# Patient Record
Sex: FEMALE | Race: WHITE | Employment: PART TIME | URBAN - METROPOLITAN AREA
[De-identification: names, ages, dates, MRNs, and addresses within clinical notes are randomized per-mention and may not be internally consistent; named-entity substitution may affect disease eponyms.]

---

## 2018-12-18 ENCOUNTER — HOSPITAL ENCOUNTER (EMERGENCY)
Facility: CLINIC | Age: 63
Discharge: ANOTHER HOSPITAL | End: 2018-12-19
Attending: EMERGENCY MEDICINE | Admitting: EMERGENCY MEDICINE
Payer: COMMERCIAL

## 2018-12-18 DIAGNOSIS — F41.9 ANXIETY: ICD-10-CM

## 2018-12-18 DIAGNOSIS — F10.220 ACUTE ALCOHOLIC INTOXICATION IN ALCOHOLISM WITHOUT COMPLICATION (H): ICD-10-CM

## 2018-12-18 LAB
ALBUMIN SERPL-MCNC: 3.3 G/DL (ref 3.4–5)
ALP SERPL-CCNC: 96 U/L (ref 40–150)
ALT SERPL W P-5'-P-CCNC: 27 U/L (ref 0–50)
ANION GAP SERPL CALCULATED.3IONS-SCNC: 5 MMOL/L (ref 3–14)
AST SERPL W P-5'-P-CCNC: 21 U/L (ref 0–45)
BASOPHILS # BLD AUTO: 0 10E9/L (ref 0–0.2)
BASOPHILS NFR BLD AUTO: 0.3 %
BILIRUB SERPL-MCNC: <0.1 MG/DL (ref 0.2–1.3)
BUN SERPL-MCNC: 21 MG/DL (ref 7–30)
CALCIUM SERPL-MCNC: 7.8 MG/DL (ref 8.5–10.1)
CHLORIDE SERPL-SCNC: 112 MMOL/L (ref 94–109)
CO2 SERPL-SCNC: 28 MMOL/L (ref 20–32)
CREAT SERPL-MCNC: 0.86 MG/DL (ref 0.52–1.04)
DIFFERENTIAL METHOD BLD: NORMAL
EOSINOPHIL # BLD AUTO: 0.2 10E9/L (ref 0–0.7)
EOSINOPHIL NFR BLD AUTO: 3.7 %
ERYTHROCYTE [DISTWIDTH] IN BLOOD BY AUTOMATED COUNT: 15 % (ref 10–15)
ETHANOL SERPL-MCNC: 0.17 G/DL
GFR SERPL CREATININE-BSD FRML MDRD: 67 ML/MIN/1.7M2
GLUCOSE SERPL-MCNC: 88 MG/DL (ref 70–99)
HCT VFR BLD AUTO: 37 % (ref 35–47)
HGB BLD-MCNC: 12.2 G/DL (ref 11.7–15.7)
IMM GRANULOCYTES # BLD: 0 10E9/L (ref 0–0.4)
IMM GRANULOCYTES NFR BLD: 0.2 %
INR PPP: 0.95 (ref 0.86–1.14)
LIPASE SERPL-CCNC: 206 U/L (ref 73–393)
LYMPHOCYTES # BLD AUTO: 2.4 10E9/L (ref 0.8–5.3)
LYMPHOCYTES NFR BLD AUTO: 42.6 %
MAGNESIUM SERPL-MCNC: 2.2 MG/DL (ref 1.6–2.3)
MCH RBC QN AUTO: 30.1 PG (ref 26.5–33)
MCHC RBC AUTO-ENTMCNC: 33 G/DL (ref 31.5–36.5)
MCV RBC AUTO: 91 FL (ref 78–100)
MONOCYTES # BLD AUTO: 0.5 10E9/L (ref 0–1.3)
MONOCYTES NFR BLD AUTO: 8.4 %
NEUTROPHILS # BLD AUTO: 2.6 10E9/L (ref 1.6–8.3)
NEUTROPHILS NFR BLD AUTO: 44.8 %
NRBC # BLD AUTO: 0 10*3/UL
NRBC BLD AUTO-RTO: 0 /100
PHOSPHATE SERPL-MCNC: 3.5 MG/DL (ref 2.5–4.5)
PLATELET # BLD AUTO: 261 10E9/L (ref 150–450)
POTASSIUM SERPL-SCNC: 4 MMOL/L (ref 3.4–5.3)
PROT SERPL-MCNC: 6.6 G/DL (ref 6.8–8.8)
RBC # BLD AUTO: 4.05 10E12/L (ref 3.8–5.2)
SODIUM SERPL-SCNC: 145 MMOL/L (ref 133–144)
WBC # BLD AUTO: 5.7 10E9/L (ref 4–11)

## 2018-12-18 PROCEDURE — 96374 THER/PROPH/DIAG INJ IV PUSH: CPT

## 2018-12-18 PROCEDURE — 83690 ASSAY OF LIPASE: CPT | Performed by: EMERGENCY MEDICINE

## 2018-12-18 PROCEDURE — 25000132 ZZH RX MED GY IP 250 OP 250 PS 637: Performed by: EMERGENCY MEDICINE

## 2018-12-18 PROCEDURE — 85025 COMPLETE CBC W/AUTO DIFF WBC: CPT | Performed by: EMERGENCY MEDICINE

## 2018-12-18 PROCEDURE — 99285 EMERGENCY DEPT VISIT HI MDM: CPT | Mod: 25

## 2018-12-18 PROCEDURE — 83735 ASSAY OF MAGNESIUM: CPT | Performed by: EMERGENCY MEDICINE

## 2018-12-18 PROCEDURE — 80320 DRUG SCREEN QUANTALCOHOLS: CPT | Performed by: EMERGENCY MEDICINE

## 2018-12-18 PROCEDURE — 96376 TX/PRO/DX INJ SAME DRUG ADON: CPT

## 2018-12-18 PROCEDURE — 85610 PROTHROMBIN TIME: CPT | Performed by: EMERGENCY MEDICINE

## 2018-12-18 PROCEDURE — 25000128 H RX IP 250 OP 636: Performed by: EMERGENCY MEDICINE

## 2018-12-18 PROCEDURE — 84100 ASSAY OF PHOSPHORUS: CPT | Performed by: EMERGENCY MEDICINE

## 2018-12-18 PROCEDURE — 80053 COMPREHEN METABOLIC PANEL: CPT | Performed by: EMERGENCY MEDICINE

## 2018-12-18 PROCEDURE — 96361 HYDRATE IV INFUSION ADD-ON: CPT

## 2018-12-18 RX ORDER — PAROXETINE 40 MG/1
40 TABLET, FILM COATED ORAL EVERY MORNING
Status: ON HOLD | COMMUNITY
End: 2018-12-21

## 2018-12-18 RX ORDER — OLANZAPINE 10 MG/1
10 TABLET, ORALLY DISINTEGRATING ORAL AT BEDTIME
Status: DISCONTINUED | OUTPATIENT
Start: 2018-12-18 | End: 2018-12-19 | Stop reason: HOSPADM

## 2018-12-18 RX ORDER — LEVOTHYROXINE SODIUM 100 UG/1
TABLET ORAL DAILY
COMMUNITY
End: 2018-12-19

## 2018-12-18 RX ORDER — MAGNESIUM OXIDE 400 MG/1
800 TABLET ORAL ONCE
Status: COMPLETED | OUTPATIENT
Start: 2018-12-18 | End: 2018-12-18

## 2018-12-18 RX ORDER — FOLIC ACID 1 MG/1
1 TABLET ORAL ONCE
Status: COMPLETED | OUTPATIENT
Start: 2018-12-18 | End: 2018-12-18

## 2018-12-18 RX ORDER — GABAPENTIN 100 MG/1
600 CAPSULE ORAL 3 TIMES DAILY
Status: ON HOLD | COMMUNITY
End: 2018-12-21

## 2018-12-18 RX ORDER — SODIUM CHLORIDE, SODIUM LACTATE, POTASSIUM CHLORIDE, CALCIUM CHLORIDE 600; 310; 30; 20 MG/100ML; MG/100ML; MG/100ML; MG/100ML
1000 INJECTION, SOLUTION INTRAVENOUS CONTINUOUS
Status: DISCONTINUED | OUTPATIENT
Start: 2018-12-18 | End: 2018-12-19 | Stop reason: HOSPADM

## 2018-12-18 RX ORDER — LANOLIN ALCOHOL/MO/W.PET/CERES
100 CREAM (GRAM) TOPICAL ONCE
Status: COMPLETED | OUTPATIENT
Start: 2018-12-18 | End: 2018-12-18

## 2018-12-18 RX ORDER — TRAZODONE HYDROCHLORIDE 50 MG/1
50 TABLET, FILM COATED ORAL AT BEDTIME
COMMUNITY
End: 2018-12-19

## 2018-12-18 RX ORDER — LORAZEPAM 2 MG/ML
1 INJECTION INTRAMUSCULAR
Status: DISCONTINUED | OUTPATIENT
Start: 2018-12-18 | End: 2018-12-19 | Stop reason: HOSPADM

## 2018-12-18 RX ORDER — MULTIVITAMIN,THERAPEUTIC
1 TABLET ORAL ONCE
Status: COMPLETED | OUTPATIENT
Start: 2018-12-18 | End: 2018-12-18

## 2018-12-18 RX ADMIN — THERA TABS 1 TABLET: TAB at 17:06

## 2018-12-18 RX ADMIN — Medication 100 MG: at 17:06

## 2018-12-18 RX ADMIN — MAGNESIUM OXIDE TAB 400 MG (241.3 MG ELEMENTAL MG) 800 MG: 400 (241.3 MG) TAB at 18:11

## 2018-12-18 RX ADMIN — LORAZEPAM 1 MG: 2 INJECTION INTRAMUSCULAR; INTRAVENOUS at 17:08

## 2018-12-18 RX ADMIN — FOLIC ACID 1 MG: 1 TABLET ORAL at 17:06

## 2018-12-18 RX ADMIN — OLANZAPINE 10 MG: 10 TABLET, ORALLY DISINTEGRATING ORAL at 18:12

## 2018-12-18 RX ADMIN — LORAZEPAM 1 MG: 2 INJECTION INTRAMUSCULAR; INTRAVENOUS at 19:02

## 2018-12-18 RX ADMIN — SODIUM CHLORIDE, POTASSIUM CHLORIDE, SODIUM LACTATE AND CALCIUM CHLORIDE 1000 ML: 600; 310; 30; 20 INJECTION, SOLUTION INTRAVENOUS at 17:06

## 2018-12-18 ASSESSMENT — ENCOUNTER SYMPTOMS: NERVOUS/ANXIOUS: 1

## 2018-12-18 ASSESSMENT — MIFFLIN-ST. JEOR: SCORE: 1347.86

## 2018-12-18 NOTE — ED PROVIDER NOTES
"  History     Chief Complaint:  Alcohol Intoxication and anxiety    The history is provided by the patient. History limited by: the patient is a poor historian.      Kay Dunham is a 63 year old female who presents for evaluation of alcohol intoxication and anxiety.  The patient reports a long history of alcohol abuse over the last several years; longest stretch of sobriety was 6 years.  Patient notes that in the last 3 months she has drunk about 1.5 pints of liquor per day and that she will frequently become anxious when she is not drinking.  She states that she \"only drinks alcohol to calm down.\"  The patient states that her last drink was 1 hour prior to presentation.  The patient's  states that she began to act very anxious and different than he had ever seen her act, prompting him to call his insurance who referred him to the emergency department.  The patient's  notes that he has never seen her act this way.  The patient does state that she sought evaluation 2 weeks ago and was told to seek out a psychiatrist.  Patient denies drug use, allergies to any drugs, thoughts of self-harm, suicidal ideation, or other complaint.  The patient denies any known psychiatric diagnosis.    Allergies:  Contrast Dye      Medications:    Neurontin  Levothyroxine  Paxil  Desyrel     Past Medical History:    The patient does not have any past pertinent medical history.     Past Surgical History:    Orthopedic surgery    Family History:    History reviewed. No pertinent family history.      Social History:  Presents with    Tobacco use: Never smoker   Alcohol use: Yes (1.5 pints of liquor per day at least)  PCP: No primary care provider on file.    Marital Status:      Review of Systems   Psychiatric/Behavioral: Negative for self-injury and suicidal ideas. The patient is nervous/anxious.    All other systems reviewed and are negative.    Physical Exam     Patient Vitals for the past 24 hrs:   BP Temp " "Temp src Pulse Heart Rate Resp SpO2 Height Weight   12/18/18 2200 97/58 -- -- 79 78 15 -- -- --   12/18/18 2100 102/58 -- -- 82 80 15 -- -- --   12/18/18 2000 119/60 -- -- 91 98 17 -- -- --   12/18/18 1920 110/62 -- -- 85 -- 16 91 % -- --   12/18/18 1616 122/64 98.3  F (36.8  C) Temporal 81 -- 18 96 % 1.676 m (5' 6\") 77.1 kg (170 lb)        Physical Exam  Nursing note and vitals reviewed.  Constitutional:  Oriented to person, place, and time. Cooperative. Smells of alcohol.  HENT:   Nose:    Nose normal.   Mouth/Throat:   Mucous membranes are normal.   Eyes:    Conjunctivae normal and EOM are normal.      Pupils are equal, round, and reactive to light.   Neck:    Trachea normal.   Cardiovascular:  Normal rate, regular rhythm, normal heart sounds and normal pulses. No murmur heard.  Pulmonary/Chest:  Effort normal and breath sounds normal.   Abdominal:   Soft. Normal appearance and bowel sounds are normal.      There is no tenderness.      There is no rebound and no CVA tenderness.   Musculoskeletal:  Extremities atraumatic x 4.   Lymphadenopathy:  No cervical adenopathy.   Neurological:   Alert and oriented to person, place, and time. Normal strength.      No cranial nerve deficit or sensory deficit. GCS eye subscore is 4. GCS verbal subscore is 5. GCS motor subscore is 6.   Skin:    Skin is intact. No rash noted.   Psychiatric:   Extremely anxious with psychomotor agitation present. Denies any suicidal thoughts.    Emergency Department Course     Laboratory:  CBC: AWNL (WBC 5.7, HGB 12.2, )   CMP: Na 145 (H), Cl 112 (H0, Ca 7.8 (L), Bilirubin <0.1 (L), Albumin 3.3 (L), Protein 6.6 (L) o/w WNL (Creatinine 0.86)   INR: 0.95  Magnesium: 2.2  Phosphorous: 3.5  Lipase: 206  EtOH: 0.17 (H)    Interventions:  1706: NS 1L IV Bolus  1706: Multivitamin 1 tablet PO  1706: Folvite 1 mg PO  1706: Thiamine 100 mg PO  1811: Magnesium oxide 800 mg PO   1812: Zyprexa 10 mg PO  1902: Ativan 1 mg IV     Emergency Department " Course:  Past medical records, nursing notes, and vitals reviewed.  1645: I performed an exam of the patient and obtained history, as documented above.    IV inserted and blood drawn. Above interventions provided. Blood was sent to the lab for further testing, results above.    1840: I rechecked the patient. Explained findings to the patient.    Patient signed out to the [facility] emergency department physician, Dr. Lea. Please see their note for further treatment, examination findings, and care the patient received while in the emergency department.      Impression & Plan    Medical Decision Making:  This is a 63-year-old female who came in for help with her alcoholic abuse as well as anxiety.  Based on her presentation, I do not believe that she is in withdrawal though.  However she was extremely anxious to the point that she required not only IV Ativan but oral Zyprexa.  She ultimately required a second IV dose of Ativan as well, however that seemed to sedate her to the point where she could not be assessed by our DEC .  She is not suicidal, and I also do not feel that she is appropriate for a detox facility at this time due to her significant anxiety.  I think she would potentially benefit from a psych evaluation in addition to a chem dep evaluation.  At this point she is still too sleepy for a DEC assessment though.  Therefore I will be signing her out to my partner Dr. Lea, who will be assuming her care.  Ultimately though, if she decides she wants to leave, I do not believe she is holdable.  However all of the appropriate paperwork has been filled out in case she does end up being admitted somewhere else.    Diagnosis:    ICD-10-CM    1. Acute alcoholic intoxication in alcoholism without complication (H) F10.220    2. Anxiety F41.9        Disposition:  Patient signed out to the [facility] emergency department physician, Dr. Lea.      Scribe Disclosure:  I, Shreyas Baires, am serving as a scribe at  8:31 PM on 12/18/2018 to document services personally performed by Kemar Mckeon MD based on my observations and the provider's statements to me.  12/18/2018   EMERGENCY DEPARTMENT      Kemar Mckeon MD  12/18/18 [id number]

## 2018-12-19 ENCOUNTER — HOSPITAL ENCOUNTER (INPATIENT)
Facility: CLINIC | Age: 63
LOS: 8 days | Discharge: HOME | End: 2018-12-27
Attending: PSYCHIATRY & NEUROLOGY | Admitting: PSYCHIATRY & NEUROLOGY
Payer: COMMERCIAL

## 2018-12-19 ENCOUNTER — TELEPHONE (OUTPATIENT)
Dept: BEHAVIORAL HEALTH | Facility: CLINIC | Age: 63
End: 2018-12-19

## 2018-12-19 VITALS
WEIGHT: 170 LBS | OXYGEN SATURATION: 97 % | HEIGHT: 66 IN | DIASTOLIC BLOOD PRESSURE: 72 MMHG | TEMPERATURE: 98.3 F | RESPIRATION RATE: 11 BRPM | SYSTOLIC BLOOD PRESSURE: 126 MMHG | BODY MASS INDEX: 27.32 KG/M2 | HEART RATE: 72 BPM

## 2018-12-19 DIAGNOSIS — F51.04 PSYCHOPHYSIOLOGICAL INSOMNIA: ICD-10-CM

## 2018-12-19 DIAGNOSIS — F10.20 UNCOMPLICATED ALCOHOL DEPENDENCE (H): Primary | ICD-10-CM

## 2018-12-19 DIAGNOSIS — F41.8 DEPRESSION WITH ANXIETY: ICD-10-CM

## 2018-12-19 PROCEDURE — 90791 PSYCH DIAGNOSTIC EVALUATION: CPT

## 2018-12-19 PROCEDURE — 25000132 ZZH RX MED GY IP 250 OP 250 PS 637: Performed by: EMERGENCY MEDICINE

## 2018-12-19 PROCEDURE — 12800012 ZZH R&B CD MH INTERMEDIATE ADULT

## 2018-12-19 PROCEDURE — 25000132 ZZH RX MED GY IP 250 OP 250 PS 637: Performed by: PSYCHIATRY & NEUROLOGY

## 2018-12-19 PROCEDURE — HZ2ZZZZ DETOXIFICATION SERVICES FOR SUBSTANCE ABUSE TREATMENT: ICD-10-PCS | Performed by: PSYCHIATRY & NEUROLOGY

## 2018-12-19 RX ORDER — ACETAMINOPHEN 325 MG/1
650 TABLET ORAL EVERY 4 HOURS PRN
Status: DISCONTINUED | OUTPATIENT
Start: 2018-12-19 | End: 2018-12-27 | Stop reason: HOSPADM

## 2018-12-19 RX ORDER — PAROXETINE 40 MG/1
40 TABLET, FILM COATED ORAL EVERY MORNING
Status: DISCONTINUED | OUTPATIENT
Start: 2018-12-20 | End: 2018-12-27 | Stop reason: HOSPADM

## 2018-12-19 RX ORDER — PAROXETINE 40 MG/1
40 TABLET, FILM COATED ORAL ONCE
Status: COMPLETED | OUTPATIENT
Start: 2018-12-19 | End: 2018-12-19

## 2018-12-19 RX ORDER — FOLIC ACID 1 MG/1
1 TABLET ORAL DAILY
Status: DISCONTINUED | OUTPATIENT
Start: 2018-12-19 | End: 2018-12-27 | Stop reason: HOSPADM

## 2018-12-19 RX ORDER — PROPRANOLOL HYDROCHLORIDE 10 MG/1
10 TABLET ORAL 3 TIMES DAILY PRN
Status: DISCONTINUED | OUTPATIENT
Start: 2018-12-19 | End: 2018-12-27 | Stop reason: HOSPADM

## 2018-12-19 RX ORDER — LANOLIN ALCOHOL/MO/W.PET/CERES
100 CREAM (GRAM) TOPICAL DAILY
Status: DISCONTINUED | OUTPATIENT
Start: 2018-12-19 | End: 2018-12-20

## 2018-12-19 RX ORDER — ACETAMINOPHEN 500 MG
500 TABLET ORAL ONCE
Status: COMPLETED | OUTPATIENT
Start: 2018-12-19 | End: 2018-12-19

## 2018-12-19 RX ORDER — GABAPENTIN 600 MG/1
600 TABLET ORAL ONCE
Status: COMPLETED | OUTPATIENT
Start: 2018-12-19 | End: 2018-12-19

## 2018-12-19 RX ORDER — LEVOTHYROXINE SODIUM 75 UG/1
75 TABLET ORAL DAILY
Status: ON HOLD | COMMUNITY
End: 2019-10-01

## 2018-12-19 RX ORDER — DIAZEPAM 5 MG
5-20 TABLET ORAL EVERY 30 MIN PRN
Status: DISCONTINUED | OUTPATIENT
Start: 2018-12-19 | End: 2018-12-27 | Stop reason: HOSPADM

## 2018-12-19 RX ORDER — GABAPENTIN 300 MG/1
600 CAPSULE ORAL 3 TIMES DAILY
Status: DISCONTINUED | OUTPATIENT
Start: 2018-12-19 | End: 2018-12-27 | Stop reason: HOSPADM

## 2018-12-19 RX ORDER — LORAZEPAM 1 MG/1
1 TABLET ORAL ONCE
Status: COMPLETED | OUTPATIENT
Start: 2018-12-19 | End: 2018-12-19

## 2018-12-19 RX ORDER — ALUMINA, MAGNESIA, AND SIMETHICONE 2400; 2400; 240 MG/30ML; MG/30ML; MG/30ML
30 SUSPENSION ORAL EVERY 4 HOURS PRN
Status: DISCONTINUED | OUTPATIENT
Start: 2018-12-19 | End: 2018-12-27 | Stop reason: HOSPADM

## 2018-12-19 RX ORDER — BISACODYL 10 MG
10 SUPPOSITORY, RECTAL RECTAL DAILY PRN
Status: DISCONTINUED | OUTPATIENT
Start: 2018-12-19 | End: 2018-12-27 | Stop reason: HOSPADM

## 2018-12-19 RX ORDER — HYDROXYZINE HYDROCHLORIDE 25 MG/1
25-50 TABLET, FILM COATED ORAL 3 TIMES DAILY PRN
Status: ON HOLD | COMMUNITY
End: 2018-12-21

## 2018-12-19 RX ORDER — LEVOTHYROXINE SODIUM 75 UG/1
75 TABLET ORAL ONCE
Status: COMPLETED | OUTPATIENT
Start: 2018-12-19 | End: 2018-12-19

## 2018-12-19 RX ORDER — ZOLMITRIPTAN 5 MG/1
5 TABLET, ORALLY DISINTEGRATING ORAL DAILY PRN
Status: ON HOLD | COMMUNITY
End: 2019-10-01

## 2018-12-19 RX ORDER — MULTIPLE VITAMINS W/ MINERALS TAB 9MG-400MCG
1 TAB ORAL DAILY
Status: DISCONTINUED | OUTPATIENT
Start: 2018-12-19 | End: 2018-12-27 | Stop reason: HOSPADM

## 2018-12-19 RX ORDER — LEVOTHYROXINE SODIUM 75 UG/1
75 TABLET ORAL DAILY
Status: DISCONTINUED | OUTPATIENT
Start: 2018-12-20 | End: 2018-12-27 | Stop reason: HOSPADM

## 2018-12-19 RX ADMIN — GABAPENTIN 600 MG: 300 CAPSULE ORAL at 15:33

## 2018-12-19 RX ADMIN — DIAZEPAM 10 MG: 5 TABLET ORAL at 20:23

## 2018-12-19 RX ADMIN — PAROXETINE 40 MG: 40 TABLET, FILM COATED ORAL at 11:05

## 2018-12-19 RX ADMIN — LORAZEPAM 1 MG: 1 TABLET ORAL at 11:04

## 2018-12-19 RX ADMIN — MULTIPLE VITAMINS W/ MINERALS TAB 1 TABLET: TAB at 15:33

## 2018-12-19 RX ADMIN — ACETAMINOPHEN 500 MG: 500 TABLET, FILM COATED ORAL at 10:11

## 2018-12-19 RX ADMIN — FOLIC ACID 1 MG: 1 TABLET ORAL at 15:34

## 2018-12-19 RX ADMIN — Medication 100 MG: at 15:34

## 2018-12-19 RX ADMIN — GABAPENTIN 600 MG: 300 CAPSULE ORAL at 20:23

## 2018-12-19 RX ADMIN — GABAPENTIN 600 MG: 600 TABLET, FILM COATED ORAL at 11:05

## 2018-12-19 RX ADMIN — LEVOTHYROXINE SODIUM 75 MCG: 75 TABLET ORAL at 11:05

## 2018-12-19 RX ADMIN — IBUPROFEN 800 MG: 200 TABLET, FILM COATED ORAL at 10:11

## 2018-12-19 ASSESSMENT — MIFFLIN-ST. JEOR: SCORE: 1377.35

## 2018-12-19 ASSESSMENT — ACTIVITIES OF DAILY LIVING (ADL)
LAUNDRY: WITH SUPERVISION
ORAL_HYGIENE: INDEPENDENT
HYGIENE/GROOMING: INDEPENDENT
DRESS: INDEPENDENT

## 2018-12-19 NOTE — PHARMACY-ADMISSION MEDICATION HISTORY
Admission medication history interview status for the 12/18/2018  admission is complete. See EPIC admission navigator for prior to admission medications     Medication history source reliability:Moderate    Actions taken by pharmacist (provider contacted, etc):  Spoke w/ patient.  Reviewed information in Care Everywhere.      Additional medication history information not noted on PTA med list :None    Medication reconciliation/reorder completed by provider prior to medication history? No    Time spent in this activity: 20 minutes    Prior to Admission medications    Medication Sig Last Dose Taking? Auth Provider   gabapentin (NEURONTIN) 100 MG capsule Take 600 mg by mouth 3 times daily 12/18/2018 at Unknown time Yes Reported, Patient   hydrOXYzine (ATARAX) 25 MG tablet Take 25-50 mg by mouth 3 times daily as needed for itching  at prn Yes Unknown, Entered By History   levothyroxine (SYNTHROID/LEVOTHROID) 75 MCG tablet Take 75 mcg by mouth daily 12/18/2018 at Unknown time Yes Unknown, Entered By History   PARoxetine (PAXIL) 40 MG tablet Take 40 mg by mouth every morning 12/18/2018 at Unknown time Yes Reported, Patient   ZOLMitriptan (ZOMIG-ZMT) 5 MG ODT Take 5 mg by mouth at onset of headache for migraine  at prn Yes Unknown, Entered By History     Kassandra Wilburn, PharmD, BCPS

## 2018-12-19 NOTE — ED NOTES
Pt brought back to room and was unsteady on feet. Pt asked to sit down on the bed and pt then laid on the floor instead. Pt did not hit her head and there was no LOC, it appeared to be more behavior than anything else.

## 2018-12-19 NOTE — TELEPHONE ENCOUNTER
S: Lacie gave clinical saying pt, age 63, was bib her  to [facility] er last juan due to intoxication.   B: bal was .17. She says she has been drinking 1.5 pints of liquor per day for the past 3 mo's. Hx of cd tx 15 yrs ago. She was sober for 6 yrs and says she has been drinking for the past year and half and heavily for past 3 mo's. No hx of sz's. Pt denies drug use. Utox not ordered. Thyroid issues but no other chronic med prob's. Hx of ADHD. She has anxiety and says she randomly starts counting and says she drinks to alleviate her anxiety.   A: assessed today; walking, coop, vol, med cleared, denies SI  R: john astorga at 9:21 am;   #3awest/straight cd/ kateali for after NOON. kristal

## 2018-12-19 NOTE — PLAN OF CARE
63 year old female being admitted for alcohol withdrawal. Pt came to unit via ambulance from [facility] ER. While at [facility] ER pt was given prn Zyprexa for being agitated. Pt BAL was 0.17. Pt has been drinking 1 1/2 pint of vodka daily. States she has been drinking this much for 1 1/2 years. Says she was sober for 6 years previously     Pt has been living in [place] for the past 4 years or more caring for her mother who had throat cancer. Pt's  stayed here in [place]. Pt's mother passed away 11 1/2 months ago. Pt came back to [place] 3 months ago. Pt has been to outpatient treatment in [place] 20 years ago.     Pt has had 5 back surgeries, right abdominal hernia and a hysterectomy.   Pt states she has chronic back pain and 10 months ago was hospitalized in [place] for 10 days for some type of possible GI Bleed. States she was taking a lot of motrin for pain. States she needed to have blood and iron transfusions. Pt reports  she also received some type of CD treatment at that time.     Kay states she tested +for MRSA related to a wound in her vaginal labia area. Pt not longer has a wound there. Message left for Infection control regarding this.     Past history methamphetamine abuse for 1 1/2 years.  Pt denies hx of DT's Seizures. Dx with depression and is on Paxil. Pt says her doctor stopped her trazodone because of a negative interaction with the Paxil.   Denies MH admissions. Denies SI on admission.    Pt is  and has 5 adult children. She is a retired .     During admission process patient was very restless. Akathesis like movements. Dr. Guerrero ordered prn Propanolol and requested a dose be given.     Patient states she is interested in Detox only at this time.     Plan: Status 15, Monitor mood and alcohol withdrawal. Fall precautions, fall bracelet and bell provided.

## 2018-12-19 NOTE — ED PROVIDER NOTES
Pt evaluated by DEC. No longer paranoid. States she's been drinking again for past 1.5 years to help control her anxiety and ADHD (and has not been compliant with her Concerta). She denies HI/SI. Is interested in detox bed. As of now there appears to be one available.  MD Anita Howard, Lalitha Galan MD  12/19/18 1595

## 2018-12-19 NOTE — ED NOTES
"Pt incredibly anxious, pacing in room and out of room in common area. Speech is pressured and filled with anxiety. Pt coming to  window every 5 minutes or so asking when the medicine is going to work. Pt states \"I'm a tough bird, I want more drugs now!\" RN redirected pt and provided a calmer envt by asking  to sit out in the ma.  "

## 2018-12-19 NOTE — ED NOTES
Signed out by Dr. Mckeon at change of shift.  In brief, patient has alcoholism and arrived wanting detox.  Was so anxious that she seemed psychotic, difficult to redirect, pressured speech.  Drinks to manage anxiety, serum alcohol 0.17.  Received Zyprexa PO and ativan IV.  Will need to sober and be assessed by DEC.  Is not appropriate for transfer to detox.  Was not suicidal.    Checked on patient, sleeping.  Plan d/w nursing for DEC when able to be assessed.    630 am: rechecked patient who is still sleeping.  Plan for DEC evaluation in am.       Yanelis Lea MD  12/19/18 [id number]

## 2018-12-19 NOTE — ED NOTES
Patient notified by writer that her bed assignment at [address] [place] [address] is a locked unit.  Pt verbalized understanding.  States the meds recently given is helping

## 2018-12-19 NOTE — ED NOTES
"Writer introduced self to patient.  She denies SI or HI.  VSS, shaky but states \"I am always like that.\" A and O x3.  Waiting DEC assessment  "

## 2018-12-19 NOTE — PLAN OF CARE
S:  Pt did not get up for 16:00 VS.  They were obtained in her room in her bed in reclining position.  She was somewhat hypotensive.  She was heavily sedated and needed to be touched by writer with each question as she fell asleep between them.  She was completely still and her hands were completely dry.    R:  Writer called Dr. Guerrero and reported this and let him know that her BP was low.  He said that she had received Zyprexa in the ED.  Writer let MD know that the propranolol that was ordered was not necessary.  He said that it was a PRN order.

## 2018-12-20 LAB
CHOLEST SERPL-MCNC: 262 MG/DL
FOLATE SERPL-MCNC: 15.1 NG/ML
GGT SERPL-CCNC: 15 U/L (ref 0–40)
HDLC SERPL-MCNC: 86 MG/DL
LDLC SERPL CALC-MCNC: 156 MG/DL
NONHDLC SERPL-MCNC: 176 MG/DL
TRIGL SERPL-MCNC: 100 MG/DL
TSH SERPL DL<=0.005 MIU/L-ACNC: 2.29 MU/L (ref 0.4–4)
VIT B12 SERPL-MCNC: 313 PG/ML (ref 193–986)

## 2018-12-20 PROCEDURE — 36415 COLL VENOUS BLD VENIPUNCTURE: CPT | Performed by: PSYCHIATRY & NEUROLOGY

## 2018-12-20 PROCEDURE — G0177 OPPS/PHP; TRAIN & EDUC SERV: HCPCS

## 2018-12-20 PROCEDURE — 99222 1ST HOSP IP/OBS MODERATE 55: CPT | Performed by: NURSE PRACTITIONER

## 2018-12-20 PROCEDURE — 99221 1ST HOSP IP/OBS SF/LOW 40: CPT | Mod: AI | Performed by: PSYCHIATRY & NEUROLOGY

## 2018-12-20 PROCEDURE — 84443 ASSAY THYROID STIM HORMONE: CPT | Performed by: PSYCHIATRY & NEUROLOGY

## 2018-12-20 PROCEDURE — 82746 ASSAY OF FOLIC ACID SERUM: CPT | Performed by: PSYCHIATRY & NEUROLOGY

## 2018-12-20 PROCEDURE — 25000132 ZZH RX MED GY IP 250 OP 250 PS 637: Performed by: PSYCHIATRY & NEUROLOGY

## 2018-12-20 PROCEDURE — 99207 ZZC CONSULT E&M CHANGED TO INITIAL LEVEL: CPT | Performed by: NURSE PRACTITIONER

## 2018-12-20 PROCEDURE — 82977 ASSAY OF GGT: CPT | Performed by: PSYCHIATRY & NEUROLOGY

## 2018-12-20 PROCEDURE — 80061 LIPID PANEL: CPT | Performed by: PSYCHIATRY & NEUROLOGY

## 2018-12-20 PROCEDURE — 82607 VITAMIN B-12: CPT | Performed by: PSYCHIATRY & NEUROLOGY

## 2018-12-20 PROCEDURE — 25000132 ZZH RX MED GY IP 250 OP 250 PS 637: Performed by: NURSE PRACTITIONER

## 2018-12-20 PROCEDURE — 12800012 ZZH R&B CD MH INTERMEDIATE ADULT

## 2018-12-20 RX ORDER — SENNOSIDES 8.6 MG
8.6 TABLET ORAL 2 TIMES DAILY PRN
Status: DISCONTINUED | OUTPATIENT
Start: 2018-12-20 | End: 2018-12-27 | Stop reason: HOSPADM

## 2018-12-20 RX ORDER — LANOLIN ALCOHOL/MO/W.PET/CERES
500 CREAM (GRAM) TOPICAL 3 TIMES DAILY
Status: COMPLETED | OUTPATIENT
Start: 2018-12-20 | End: 2018-12-22

## 2018-12-20 RX ORDER — POLYETHYLENE GLYCOL 3350 17 G/17G
17 POWDER, FOR SOLUTION ORAL DAILY PRN
Status: DISCONTINUED | OUTPATIENT
Start: 2018-12-20 | End: 2018-12-27 | Stop reason: HOSPADM

## 2018-12-20 RX ORDER — SENNOSIDES 8.6 MG
8.6 TABLET ORAL 2 TIMES DAILY PRN
Status: DISCONTINUED | OUTPATIENT
Start: 2018-12-20 | End: 2018-12-21

## 2018-12-20 RX ORDER — OLANZAPINE 5 MG/1
5 TABLET, ORALLY DISINTEGRATING ORAL 3 TIMES DAILY PRN
Status: DISCONTINUED | OUTPATIENT
Start: 2018-12-20 | End: 2018-12-22

## 2018-12-20 RX ADMIN — MULTIPLE VITAMINS W/ MINERALS TAB 1 TABLET: TAB at 08:32

## 2018-12-20 RX ADMIN — SENNOSIDES 8.6 MG: 8.6 TABLET, FILM COATED ORAL at 14:11

## 2018-12-20 RX ADMIN — DIAZEPAM 10 MG: 5 TABLET ORAL at 14:11

## 2018-12-20 RX ADMIN — Medication 500 MG: at 20:35

## 2018-12-20 RX ADMIN — Medication 100 MG: at 08:32

## 2018-12-20 RX ADMIN — DIAZEPAM 5 MG: 5 TABLET ORAL at 17:04

## 2018-12-20 RX ADMIN — Medication 500 MG: at 14:14

## 2018-12-20 RX ADMIN — FOLIC ACID 1 MG: 1 TABLET ORAL at 08:32

## 2018-12-20 RX ADMIN — PAROXETINE 40 MG: 40 TABLET, FILM COATED ORAL at 08:32

## 2018-12-20 RX ADMIN — GABAPENTIN 600 MG: 300 CAPSULE ORAL at 14:11

## 2018-12-20 RX ADMIN — PROPRANOLOL HYDROCHLORIDE 10 MG: 10 TABLET ORAL at 08:32

## 2018-12-20 RX ADMIN — DIAZEPAM 10 MG: 5 TABLET ORAL at 10:42

## 2018-12-20 RX ADMIN — GABAPENTIN 600 MG: 300 CAPSULE ORAL at 08:32

## 2018-12-20 RX ADMIN — DIAZEPAM 10 MG: 5 TABLET ORAL at 20:36

## 2018-12-20 RX ADMIN — GABAPENTIN 600 MG: 300 CAPSULE ORAL at 20:36

## 2018-12-20 RX ADMIN — OLANZAPINE 5 MG: 5 TABLET, ORALLY DISINTEGRATING ORAL at 16:31

## 2018-12-20 RX ADMIN — DIAZEPAM 10 MG: 5 TABLET ORAL at 08:32

## 2018-12-20 RX ADMIN — LEVOTHYROXINE SODIUM 75 MCG: 75 TABLET ORAL at 08:32

## 2018-12-20 ASSESSMENT — ACTIVITIES OF DAILY LIVING (ADL)
HYGIENE/GROOMING: INDEPENDENT
DRESS: INDEPENDENT
LAUNDRY: WITH SUPERVISION
ORAL_HYGIENE: INDEPENDENT
LAUNDRY: WITH SUPERVISION
HYGIENE/GROOMING: INDEPENDENT
DRESS: INDEPENDENT
ORAL_HYGIENE: INDEPENDENT

## 2018-12-20 NOTE — PROGRESS NOTES
Met with Pt to initiate discharge planning.  Pt appears very anxious and endorses extreme anxiety.  Pt is rocking from foot to foot and wringing her hands.  She is stuttering and having trouble finding words.  She is having trouble understanding conversation and needs information repeated for understanding.  Pt is interested in referral for psychiatrist and therapist and possibly information for AA or non-12 step support groups.  Will meet with Pt when she is less anxious and able to concentrate.

## 2018-12-20 NOTE — PLAN OF CARE
Behavioral Team Discussion: (12/20/2018)    Continued Stay Criteria/Rationale: Patient admitted for alcohol Use Disorder.  Plan: The following services will be provided to the patient; psychiatric assessment, medication management, therapeutic milieu, individual and group support, and skills groups.   Participants: 3A Provider: Dr. Luis Armando Drew MD; 3A RN's: Jami Yi, RN; 3A CM's: Cony Reardon .  Summary/Recommendation: Providers will assess today for treatment recommendations, discharge planning, and aftercare plans. CM will meet with pt for discharge planning.   Medical/Physical: Deferred (see medical notes).  Precautions:   Behavioral Orders   Procedures     Code 1 - Restrict to Unit     Fall precautions     Routine Programming     As clinically indicated     Status 15     Every 15 minutes.     Withdrawal precautions     Rationale for change in precautions or plan: N/A  Progress: No Change.

## 2018-12-20 NOTE — CONSULTS
Internal Medicine Consult - Initial Visit       Kay Dunham MRN# [id number]   YOB: 1955 Age: 63 year old   Date of Admission: 12/19/2018  PCP: Erick Hunter  Date of Service: 12/20/2018    Referring Provider: Luis Armando Drew MD  Reason for Consult: Medical co-management of withdrawal          Assessment and Recommendations:   Kay Dunham is a 63 year old female with a history of alcohol abuse, hypothyroidism, depression, and anxiety admitted to station 3A for detox from alcohol.      # Alcohol withdrawal, hx of alcohol abuse - MSSA 13 this shift.  No hx of withdrawal seizures. Tangential and very anxious.  - Continue MSSA   - Start high dose thiamine 500mg TID x 2 days, then 250mg daily for 5 days   - Continue folvite, multi-vites  - Further management per Psychiatry     # Anxiety, depression - Last seen by PCP on 12/5 for anxiety, Paxil increased to 40mg daily.  Noted that she is not currently under the care of a psychiatrist or counselor.      - Management per Psychiatry     # Hypothyroidism - TSH wnl this admission. On Synthroid 75mcg PTA.   - Continue PTA Synthroid    # Hypernatremia - Na 145 on admission.  Likely 2/2 dehydration from alcohol ingestion.  Not currently on any diuretics.      - Encourage PO fluids   - Recheck BMP in am     # Chronic pain - Reports hx of multiple ortho-spine surgeries.  Denies acute pain on exam.  On Gabapentin 600mg TID prior to admission.    - Continue PTA Gabapentin   - Soft care mattress     # Constipation - Reports that last BM was yesterday prior to admission.   - Senna and Miralax PRN         Medicine will follow labs peripherally and sign off, no further recommendations at this time.  Please feel free to reconsult if patient's symptoms worsen or if new problems arise.  Thank you for the opportunity to care for this patient.       Noelle Rodriguez CNP  Hospitalist Service   Pager: [phone]             History of Present Illness:   History is  "obtained from the patient and medical record.     This patient is a 63 year old female with a history of alcohol abuse, hypothyroidism, depression, and anxiety admitted to station 3A for detox from alcohol.    Internal Medicine service was asked to see patient for medical co-management of detox.  Tahmina is very anxious and states that she \"can't sit still\".  She is disorganized and asking me if I'm taking her to [employer] [facility] [employer] to look for a bra.  Then asks me if I'm taking her back to do a Pap smear.  She is difficult to redirect and asks about what kinds of medications she has ordered for anxiety.  She reports constipation, but denies any other medical complaints.  Reports feeling \"impatient\".           Review of Systems:   A 10 point ROS was performed and negative unless otherwise noted in HPI.           Past Medical History:   Reviewed and updated in Epic.  No past medical history on file.          Past Surgical History:   Reviewed and updated in Epic.  Past Surgical History:   Procedure Laterality Date     ORTHOPEDIC SURGERY               Social History:   Reviewed and updated in [employer].  Social History     Socioeconomic History     Marital status:      Spouse name: Not on file     Number of children: Not on file     Years of education: Not on file     Highest education level: Not on file   Social Needs     Financial resource strain: Not on file     Food insecurity - worry: Not on file     Food insecurity - inability: Not on file     Transportation needs - medical: Not on file     Transportation needs - non-medical: Not on file   Occupational History     Not on file   Tobacco Use     Smoking status: Never Smoker   Substance and Sexual Activity     Alcohol use: Yes     Comment: 1.5 pints per day at least     Drug use: No     Sexual activity: Not on file   Other Topics Concern     Not on file   Social History Narrative     Not on file              Family History:   Reviewed and updated in Epic.  No family " "history on file.          Allergies:     Allergies   Allergen Reactions     Contrast Dye Swelling             Medications:     Current Facility-Administered Medications   Medication     acetaminophen (TYLENOL) tablet 650 mg     alum & mag hydroxide-simethicone (MYLANTA ES/MAALOX  ES) suspension 30 mL     bisacodyl (DULCOLAX) Suppository 10 mg     diazepam (VALIUM) tablet 5-20 mg     folic acid (FOLVITE) tablet 1 mg     gabapentin (NEURONTIN) capsule 600 mg     levothyroxine (SYNTHROID/LEVOTHROID) tablet 75 mcg     magnesium hydroxide (MILK OF MAGNESIA) suspension 30 mL     multivitamin w/minerals (THERA-VIT-M) tablet 1 tablet     OLANZapine zydis (zyPREXA) ODT tab 5 mg     PARoxetine (PAXIL) tablet 40 mg     propranolol (INDERAL) tablet 10 mg     vitamin B1 (THIAMINE) tablet 100 mg            Physical Exam:   Blood pressure 122/78, pulse 80, temperature 97.9  F (36.6  C), temperature source Tympanic, resp. rate 16, height 1.702 m (5' 7\"), weight 78.5 kg (173 lb).  Body mass index is 27.1 kg/m .    GENERAL: Alert and oriented x 3. Well nourished, well developed.  No acute distress.    HEENT: Normocephalic, atraumatic. Anicteric sclera. Mucous membranes moist.   CV: RRR. S1, S2. No murmurs appreciated.   RESPIRATORY: Effort normal on room air. Lungs CTAB with no wheezing, rales, or rhonchi.   GI: Abdomen soft and non distended, bowel sounds present x all 4 quadrants. No tenderness, rebound, or guarding.   NEUROLOGICAL: No focal deficits. Follows commands.  Strength equal in upper and lower extremities. Tremulous.  MUSCULOSKELETAL: No joint swelling or tenderness. Moves all extremities.   EXTREMITIES: No gross deformities. No peripheral edema. Intact bilateral pedal pulses.   SKIN: Grossly warm, dry, and intact. No jaundice. No rashes.             Data:   I personally reviewed the following studies:    ROUTINE IP LABS (Last four results)  CMP   Recent Labs   Lab 12/18/18  1648   *   POTASSIUM 4.0   CHLORIDE 112* "   CO2 28   ANIONGAP 5   GLC 88   BUN 21   CR 0.86   MART 7.8*   MAG 2.2   PHOS 3.5   PROTTOTAL 6.6*   ALBUMIN 3.3*   BILITOTAL <0.1*   ALKPHOS 96   AST 21   ALT 27     CBC   Recent Labs   Lab 12/18/18  1648   WBC 5.7   RBC 4.05   HGB 12.2   HCT 37.0   MCV 91   MCH 30.1   MCHC 33.0   RDW 15.0        INR   Recent Labs   Lab 12/18/18  1648   INR 0.95           Unresulted Labs Ordered in the Past 30 Days of this Admission     Date and Time Order Name Status Description    12/20/2018 0030 Vitamin B12 In process     12/20/2018 0030 Folate In process

## 2018-12-20 NOTE — PROGRESS NOTES
Behavioral Health  Note    Behavioral Health  Spirituality Group Note    UNIT 3AW    Name: Kay Dunham YOB: 1955   MRN: [id number] Age: 63 year old      Patient attended -led group, which included discussion of spirituality, coping with illness and building resilience.    Patient attended group for 1.0 hrs.    The patient actively participated in group discussion and patient demonstrated an appreciation of topic's application for their personal circumstances.     Topic/Focus of today's spirituality group: Courage and Transformation Pt. I: Your Spiritual Journey  IMR tie-in: Taking Action    Mally Cerrato MDiv, [facility]  Lead , Adult Behavioral Health  Pager [phone]

## 2018-12-20 NOTE — PROGRESS NOTES
Pt very restless in am. Prn propanolol given and 10 mg valium for alcohol withdrawal. Pt medicated 2 more times with valium 10 mg. After dose of valium patient restlessness decreased. Pt out on unit. Alert and orientated. Order for senakot obtained for report of constipation. Soft care mattress applied. Thiamine ordered.

## 2018-12-20 NOTE — H&P
Admitted:     12/19/2018      IDENTIFYING INFORMATION:  The patient is a 63-year-old  female.  She is retired.  She works in the post office.  She has 5 kids.      CHIEF COMPLAINT:  Alcohol.      She was sober for 6 years and relapsed a year and a half ago.  She was taking care of her mother who has since passed away.  Alcohol is her drug of choice.  She has tolerance, withdrawal, progressive use with loss of control, tried to quit unsuccessfully despite negative consequences, strained her relationships.  She does not use any drugs, does not smoke, does not jeter.  She takes Paxil for depression and anxiety.  She does not have any suicidal or homicidal ideation.  Does not have auditory or visual hallucinations.  She also has history of being on Concerta.      PAST PSYCHIATRIC HISTORY:  Never psychiatrically hospitalized.  She was in 1 chemical dependency treatment in the past.      PAST MEDICAL HISTORY:  She has had 5 back surgeries and she takes gabapentin for it.  She also has migraines and hypothyroidism.  She has right abdominal surgery and hysterectomy.      FAMILY HISTORY:  Mother, father and sister have alcoholism.  No family mental history.      SOCIAL HISTORY:  Born in [place], good childhood, no abuse.      MENTAL STATUS EXAMINATION:  The patient is a 63-year-old  female who appears her stated age. Jerky MOVEMENTS MIMICS TD, TREMORS+ She has adequate grooming, adequate hygiene, anxious.MOOD  Affect is congruent.  Speech is spontaneous, normal rate.  Does not have any suicidal or homicidal ideation.  Does not have auditory or visual hallucinations.linear tp alert oriented x3   Recent and remote memory, language, fund of knowledge are all adequate.  No loose associations.  The patient does not have any active suicidal or homicidal ideation, plan or intent.      DIAGNOSES:  Alcohol use disorder, severe;   major depressive disorder, recurrent, without psychotic features; history of anxiety.       PLAN:  The patient will be detoxed off alcohol using MSSA protocol and Valium.  The patient will be continued on Paxil 40 mg and gabapentin 600 mg 3 times a day.  The patient will have Zyprexa p.r.n. for psychotic agitation.  The patient is ambivalent on what she wants to do in terms of treatment.  The patient signed neuroleptic consent.  She will be seen by Case Management and Internal Medicine.         LUISA MIGUEL MD             D: 2018   T: 2018   MT: ADEN      Name:     VISH DELONG   MRN:      [id number]        Account:      [id number]   :      1955        Admitted:     2018                   Document: [id number]

## 2018-12-21 ENCOUNTER — TELEPHONE (OUTPATIENT)
Dept: BEHAVIORAL HEALTH | Facility: CLINIC | Age: 63
End: 2018-12-21

## 2018-12-21 LAB — SODIUM SERPL-SCNC: 144 MMOL/L (ref 133–144)

## 2018-12-21 PROCEDURE — 25000132 ZZH RX MED GY IP 250 OP 250 PS 637: Performed by: PSYCHIATRY & NEUROLOGY

## 2018-12-21 PROCEDURE — 99232 SBSQ HOSP IP/OBS MODERATE 35: CPT | Performed by: PSYCHIATRY & NEUROLOGY

## 2018-12-21 PROCEDURE — 12800012 ZZH R&B CD MH INTERMEDIATE ADULT

## 2018-12-21 PROCEDURE — 84295 ASSAY OF SERUM SODIUM: CPT | Performed by: NURSE PRACTITIONER

## 2018-12-21 PROCEDURE — 36415 COLL VENOUS BLD VENIPUNCTURE: CPT | Performed by: NURSE PRACTITIONER

## 2018-12-21 PROCEDURE — 25000132 ZZH RX MED GY IP 250 OP 250 PS 637: Performed by: NURSE PRACTITIONER

## 2018-12-21 RX ORDER — PAROXETINE 40 MG/1
40 TABLET, FILM COATED ORAL EVERY MORNING
Qty: 30 TABLET | Refills: 0 | Status: ON HOLD | OUTPATIENT
Start: 2018-12-21 | End: 2019-10-01

## 2018-12-21 RX ORDER — GABAPENTIN 300 MG/1
300 CAPSULE ORAL 3 TIMES DAILY
Qty: 90 CAPSULE | Refills: 0 | Status: SHIPPED | OUTPATIENT
Start: 2018-12-21 | End: 2019-09-26

## 2018-12-21 RX ORDER — MULTIPLE VITAMINS W/ MINERALS TAB 9MG-400MCG
1 TAB ORAL DAILY
Qty: 30 TABLET | Refills: 0 | Status: SHIPPED | OUTPATIENT
Start: 2018-12-22 | End: 2019-01-21

## 2018-12-21 RX ADMIN — SENNOSIDES 8.6 MG: 8.6 TABLET, FILM COATED ORAL at 11:34

## 2018-12-21 RX ADMIN — Medication 500 MG: at 14:58

## 2018-12-21 RX ADMIN — MULTIPLE VITAMINS W/ MINERALS TAB 1 TABLET: TAB at 08:22

## 2018-12-21 RX ADMIN — DIAZEPAM 10 MG: 5 TABLET ORAL at 20:20

## 2018-12-21 RX ADMIN — Medication 500 MG: at 08:22

## 2018-12-21 RX ADMIN — OLANZAPINE 5 MG: 5 TABLET, ORALLY DISINTEGRATING ORAL at 14:59

## 2018-12-21 RX ADMIN — GABAPENTIN 600 MG: 300 CAPSULE ORAL at 14:58

## 2018-12-21 RX ADMIN — GABAPENTIN 600 MG: 300 CAPSULE ORAL at 20:20

## 2018-12-21 RX ADMIN — Medication 500 MG: at 20:20

## 2018-12-21 RX ADMIN — GABAPENTIN 600 MG: 300 CAPSULE ORAL at 08:22

## 2018-12-21 RX ADMIN — OLANZAPINE 5 MG: 5 TABLET, ORALLY DISINTEGRATING ORAL at 06:44

## 2018-12-21 RX ADMIN — DIAZEPAM 10 MG: 5 TABLET ORAL at 11:10

## 2018-12-21 RX ADMIN — DIAZEPAM 10 MG: 5 TABLET ORAL at 16:24

## 2018-12-21 RX ADMIN — PAROXETINE 40 MG: 40 TABLET, FILM COATED ORAL at 08:22

## 2018-12-21 RX ADMIN — LEVOTHYROXINE SODIUM 75 MCG: 75 TABLET ORAL at 08:23

## 2018-12-21 RX ADMIN — FOLIC ACID 1 MG: 1 TABLET ORAL at 08:23

## 2018-12-21 ASSESSMENT — ACTIVITIES OF DAILY LIVING (ADL)
HYGIENE/GROOMING: INDEPENDENT
ORAL_HYGIENE: INDEPENDENT
LAUNDRY: WITH SUPERVISION
DRESS: INDEPENDENT

## 2018-12-21 NOTE — PROGRESS NOTES
Pt is considering option of [facility]ging [facility].  Requested financial breakdown from business office as Pt has private insurance.

## 2018-12-21 NOTE — TELEPHONE ENCOUNTER
[facility] benefits have been added to referral notes    Pt will require financial counseling clearance before admission to [facility]    Thanks,    Bhaskar  Financial Counselor  [facility]  [phone]

## 2018-12-21 NOTE — PROGRESS NOTES
"[facility], [place]   Psychiatric Progress Note    Interim history   This is a 63 year old female with Alcohol use disorder, severe; major depressive disorder, recurrent, without psychotic features; history of anxiety. .Pt seen in rounds. Patient's mood is ok   Energy Level:MODERATE  Sleep:No concerns, sleeps well through night  Appetite:normal motivation interest    denied any Suicidal/homicidal ideation/plan intent.  Denied any psychosis  No prior suicde attempts  No access to gun  Pt is in detox  Pt mssa score are monitered  Tolerating meds and has no side effects.              Medications:     Current Facility-Administered Medications   Medication     acetaminophen (TYLENOL) tablet 650 mg     alum & mag hydroxide-simethicone (MYLANTA ES/MAALOX  ES) suspension 30 mL     bisacodyl (DULCOLAX) Suppository 10 mg     diazepam (VALIUM) tablet 5-20 mg     folic acid (FOLVITE) tablet 1 mg     gabapentin (NEURONTIN) capsule 600 mg     levothyroxine (SYNTHROID/LEVOTHROID) tablet 75 mcg     magnesium hydroxide (MILK OF MAGNESIA) suspension 30 mL     multivitamin w/minerals (THERA-VIT-M) tablet 1 tablet     OLANZapine zydis (zyPREXA) ODT tab 5 mg     PARoxetine (PAXIL) tablet 40 mg     polyethylene glycol (MIRALAX/GLYCOLAX) Packet 17 g     propranolol (INDERAL) tablet 10 mg     sennosides (SENOKOT) tablet 8.6 mg     sennosides (SENOKOT) tablet 8.6 mg     [START ON 12/23/2018] thiamine tablet 250 mg     vitamin B1 (THIAMINE) tablet 500 mg             Allergies:     Allergies   Allergen Reactions     Contrast Dye Swelling            Psychiatric Examination:   Blood pressure 116/73, pulse 82, temperature 97.3  F (36.3  C), temperature source Oral, resp. rate 16, height 1.702 m (5' 7\"), weight 78.5 kg (173 lb).  Weight is 173 lbs 0 oz  Body mass index is 27.1 kg/m .    Appearance:  awake, alert and adequately groomed  Attitude:  cooperative  Eye Contact:  good  Mood:  better  Affect:  appropriate and " in normal range and mood congruent  Speech:  clear, coherent rate /rhythm are good  Psychomotor Behavior:  no evidence of tardive dyskinesia, dystonia, or tics and intact station, gait and muscle tone  Throught Process:  logical  Associations:  no loose associations  Thought Content:  no evidence of suicidal ideation or homicidal ideation, no evidence of psychotic thought, no auditory hallucinations present and no visual hallucinations present  Insight:  limited  Judgement:  limited  Oriented to:  time, person, and place  Attention Span and Concentration:  intact  Recent and Remote Memory:  intact  Language fund of knowledge are adequate         Labs:     No results found for: NTBNPI, NTBNP  Lab Results   Component Value Date    WBC 5.7 12/18/2018    HGB 12.2 12/18/2018    HCT 37.0 12/18/2018    MCV 91 12/18/2018     12/18/2018     Lab Results   Component Value Date    TSH 2.29 12/20/2018            DIAGNOSES:  Alcohol use disorder, severe; major depressive disorder, recurrent, without psychotic features; history of anxiety.      PLAN:  The patient will be detoxed off alcohol using MSSA protocol and Valium.      The patient will be continued on Paxil 40 mg and gabapentin 600 mg 3 times a day.  The patient will have Zyprexa p.r.n. for psychotic agitation.  The patient is ambivalent on what she wants to do in terms of treatment.  The patient signed neuroleptic consent.             Laboratory/Imaging: reviewed with patient   Consults: internal medicine consult reviewed  Patient will be treated in therapeutic milieu with appropriate individual and group therapies as described.  PDMP CHECKED     Medical diagnoses to be addressed this admission:    Plan:    # Alcohol withdrawal, hx of alcohol abuse - MSSA 13 this shift.  No hx of withdrawal seizures. Tangential and very anxious.  - Continue MSSA   - Start high dose thiamine 500mg TID x 2 days, then 250mg daily for 5 days   - Continue folvite, multi-vites  - Further  management per Psychiatry      # Anxiety, depression - Last seen by PCP on 12/5 for anxiety, Paxil increased to 40mg daily.  Noted that she is not currently under the care of a psychiatrist or counselor.      - Management per Psychiatry      # Hypothyroidism - TSH wnl this admission. On Synthroid 75mcg PTA.   - Continue PTA Synthroid     # Hypernatremia - Na 145 on admission.  Likely 2/2 dehydration from alcohol ingestion.  Not currently on any diuretics.      - Encourage PO fluids   - Recheck BMP in am      # Chronic pain - Reports hx of multiple ortho-spine surgeries.  Denies acute pain on exam.  On Gabapentin 600mg TID prior to admission.    - Continue PTA Gabapentin   - Soft care mattress      # Constipation - Reports that last BM was yesterday prior to admission.   - Senna and Miralax PRN          Legal Status: voluntary    Safety Assessment:   Checks:  15 min  Precautions: withdrawal precautions  Pt has not required locked seclusion or restraints in the past 24 hours to maintain safety, please refer to RN documentation for further details.  Discussed with patient many issues of addiction,triggers, relapse, and establishing a solid recovery program.  Able to give informed consent:  YES   Discussed Risks/Benefits/Side Effects/Alternatives: YES    After discussion of the indications, risks, benefits, alternatives and consequences of no treatment, the patient elects to complete detox and will trt woith her     Pt will be transferred to DR Guerrero as this provider is on vacation, Patient  informed

## 2018-12-21 NOTE — PROGRESS NOTES
Brief Medicine Note:    IM peripherally following Na which has normalized. Will sign off. Please contact with future questions or concerns    Genny Harding PA-C  Internal Medicine SKIP  [phone]

## 2018-12-21 NOTE — TELEPHONE ENCOUNTER
LP SCREEN TELEPHONE NOTE   Kay Dunham paperwork was reviewed by RINKU De La Garza and the patient was deemed ELIGIBLE for the LP program.     Medical: Deferred, because this patient is currently on 3A IP detoxification unit.     Insurance: [employer] and all levels of care require pre-certification/authorization and there will be a significant out of pocket expense for the LP program.     This will be a: 3A DIRECT TRANSFER, The LP RN will complete the [facility] & ISP.  The primary counselors will complete the LP UPDATE.     IV: This patient is not an IV drug user.     Business office: Last name H-Q:  The patient will need to talk to and be financially approved for the LP program by Bhaskar Metcalf @ ([phone]) or someone else @ [employer] B.O. #: [phone].     List: This patient CAN NOT be placed on the PRIORITY LP Waiting List until after:    1.) An pre-certification/authorization has been obtained from [employer] for the LP program.  2.) The patient will need to be financially approved for the LP program by Bhaskar Metcalf @ ([phone]) or someone else @ [employer] B.O. #: [phone].     Group: [facility]     Additional Info as needed: Given the patient's [employer] funding source which will not cover the Lodging cost of the LP program, the patient may be better served being [facility] or other program which would like be covered at a higher level with her funding source.     The best current contact telephone number for the patient is: 3A @ [phone], or @ [phone] if discharged home       Thanks,  RINKU De La Garza   12/21/2018

## 2018-12-21 NOTE — PROGRESS NOTES
Pt came to desk requesting prn zyprexa for feeling anxious. Zyprexa 5 mg given.  Pt also had requested senokot for constipation.

## 2018-12-21 NOTE — PLAN OF CARE
Pt medicated x 1 with valium 10 mg. Pt reports feeling anxious. Gets fidgety and restless body movements. VSS. Pt has received a total of 65 mg of valium since admission. No oversedation noted. See patient care order for Kay to wear shoes with laces. Pt states she has foot issues and walking on hard floor is painful. See order to discharge patient when detox and CD assessment complete. Denies SI.

## 2018-12-22 PROCEDURE — 12800012 ZZH R&B CD MH INTERMEDIATE ADULT

## 2018-12-22 PROCEDURE — 25000132 ZZH RX MED GY IP 250 OP 250 PS 637: Performed by: NURSE PRACTITIONER

## 2018-12-22 PROCEDURE — 25000132 ZZH RX MED GY IP 250 OP 250 PS 637: Performed by: PSYCHIATRY & NEUROLOGY

## 2018-12-22 RX ORDER — OLANZAPINE 5 MG/1
5 TABLET, ORALLY DISINTEGRATING ORAL 4 TIMES DAILY PRN
Status: DISCONTINUED | OUTPATIENT
Start: 2018-12-22 | End: 2018-12-23 | Stop reason: CLARIF

## 2018-12-22 RX ORDER — BENZTROPINE MESYLATE 0.5 MG/1
0.5 TABLET ORAL 2 TIMES DAILY PRN
Status: DISCONTINUED | OUTPATIENT
Start: 2018-12-22 | End: 2018-12-23 | Stop reason: CLARIF

## 2018-12-22 RX ADMIN — FOLIC ACID 1 MG: 1 TABLET ORAL at 08:11

## 2018-12-22 RX ADMIN — Medication 500 MG: at 08:13

## 2018-12-22 RX ADMIN — DIAZEPAM 5 MG: 5 TABLET ORAL at 20:26

## 2018-12-22 RX ADMIN — OLANZAPINE 5 MG: 5 TABLET, ORALLY DISINTEGRATING ORAL at 11:53

## 2018-12-22 RX ADMIN — DIAZEPAM 10 MG: 5 TABLET ORAL at 03:50

## 2018-12-22 RX ADMIN — PAROXETINE 40 MG: 40 TABLET, FILM COATED ORAL at 08:11

## 2018-12-22 RX ADMIN — MULTIPLE VITAMINS W/ MINERALS TAB 1 TABLET: TAB at 08:12

## 2018-12-22 RX ADMIN — LEVOTHYROXINE SODIUM 75 MCG: 75 TABLET ORAL at 08:12

## 2018-12-22 RX ADMIN — SENNOSIDES 8.6 MG: 8.6 TABLET, FILM COATED ORAL at 11:53

## 2018-12-22 RX ADMIN — GABAPENTIN 600 MG: 300 CAPSULE ORAL at 16:54

## 2018-12-22 RX ADMIN — GABAPENTIN 600 MG: 300 CAPSULE ORAL at 20:26

## 2018-12-22 RX ADMIN — OLANZAPINE 5 MG: 5 TABLET, ORALLY DISINTEGRATING ORAL at 08:10

## 2018-12-22 RX ADMIN — GABAPENTIN 600 MG: 300 CAPSULE ORAL at 08:11

## 2018-12-22 ASSESSMENT — ACTIVITIES OF DAILY LIVING (ADL)
DRESS: INDEPENDENT
HYGIENE/GROOMING: INDEPENDENT
ORAL_HYGIENE: INDEPENDENT

## 2018-12-22 NOTE — PLAN OF CARE
Continues in detox status on alcohol withdrawal protocol. MSSA scores 7 and 7.States she is eating and taking fluids well. Denies suicide ideation and thoughts of self harm. Denies complaints of physical discomfort. Complains of high anxiety.Medicated x2 with Zyprexa per patient request.

## 2018-12-23 ENCOUNTER — APPOINTMENT (OUTPATIENT)
Dept: GENERAL RADIOLOGY | Facility: CLINIC | Age: 63
End: 2018-12-23
Attending: INTERNAL MEDICINE
Payer: COMMERCIAL

## 2018-12-23 LAB
ANION GAP SERPL CALCULATED.3IONS-SCNC: 8 MMOL/L (ref 3–14)
BUN SERPL-MCNC: 19 MG/DL (ref 7–30)
CALCIUM SERPL-MCNC: 8.5 MG/DL (ref 8.5–10.1)
CHLORIDE SERPL-SCNC: 108 MMOL/L (ref 94–109)
CO2 SERPL-SCNC: 27 MMOL/L (ref 20–32)
CREAT SERPL-MCNC: 0.9 MG/DL (ref 0.52–1.04)
D DIMER PPP FEU-MCNC: 0.9 UG/ML FEU (ref 0–0.5)
ERYTHROCYTE [DISTWIDTH] IN BLOOD BY AUTOMATED COUNT: 15.4 % (ref 10–15)
GFR SERPL CREATININE-BSD FRML MDRD: 68 ML/MIN/{1.73_M2}
GLUCOSE SERPL-MCNC: 131 MG/DL (ref 70–99)
HCT VFR BLD AUTO: 37.3 % (ref 35–47)
HGB BLD-MCNC: 11.7 G/DL (ref 11.7–15.7)
MCH RBC QN AUTO: 30.3 PG (ref 26.5–33)
MCHC RBC AUTO-ENTMCNC: 31.4 G/DL (ref 31.5–36.5)
MCV RBC AUTO: 97 FL (ref 78–100)
NT-PROBNP SERPL-MCNC: 12 PG/ML (ref 0–900)
PLATELET # BLD AUTO: 234 10E9/L (ref 150–450)
POTASSIUM SERPL-SCNC: 4.2 MMOL/L (ref 3.4–5.3)
RBC # BLD AUTO: 3.86 10E12/L (ref 3.8–5.2)
SODIUM SERPL-SCNC: 143 MMOL/L (ref 133–144)
TROPONIN I SERPL-MCNC: <0.015 UG/L (ref 0–0.04)
WBC # BLD AUTO: 8 10E9/L (ref 4–11)

## 2018-12-23 PROCEDURE — 85027 COMPLETE CBC AUTOMATED: CPT | Performed by: INTERNAL MEDICINE

## 2018-12-23 PROCEDURE — 93005 ELECTROCARDIOGRAM TRACING: CPT

## 2018-12-23 PROCEDURE — 12800012 ZZH R&B CD MH INTERMEDIATE ADULT

## 2018-12-23 PROCEDURE — 71046 X-RAY EXAM CHEST 2 VIEWS: CPT

## 2018-12-23 PROCEDURE — 36415 COLL VENOUS BLD VENIPUNCTURE: CPT | Performed by: INTERNAL MEDICINE

## 2018-12-23 PROCEDURE — 99207 ZZC MOONLIGHTING INDICATOR: CPT | Performed by: INTERNAL MEDICINE

## 2018-12-23 PROCEDURE — 25000132 ZZH RX MED GY IP 250 OP 250 PS 637: Performed by: PSYCHIATRY & NEUROLOGY

## 2018-12-23 PROCEDURE — 93010 ELECTROCARDIOGRAM REPORT: CPT | Performed by: INTERNAL MEDICINE

## 2018-12-23 PROCEDURE — 25000132 ZZH RX MED GY IP 250 OP 250 PS 637: Performed by: NURSE PRACTITIONER

## 2018-12-23 PROCEDURE — 84484 ASSAY OF TROPONIN QUANT: CPT | Performed by: INTERNAL MEDICINE

## 2018-12-23 PROCEDURE — 85379 FIBRIN DEGRADATION QUANT: CPT | Performed by: INTERNAL MEDICINE

## 2018-12-23 PROCEDURE — 80048 BASIC METABOLIC PNL TOTAL CA: CPT | Performed by: INTERNAL MEDICINE

## 2018-12-23 PROCEDURE — 83880 ASSAY OF NATRIURETIC PEPTIDE: CPT | Performed by: INTERNAL MEDICINE

## 2018-12-23 RX ORDER — QUETIAPINE FUMARATE 25 MG/1
25 TABLET, FILM COATED ORAL EVERY 6 HOURS PRN
Status: DISCONTINUED | OUTPATIENT
Start: 2018-12-23 | End: 2018-12-25

## 2018-12-23 RX ORDER — LANOLIN ALCOHOL/MO/W.PET/CERES
3 CREAM (GRAM) TOPICAL
Status: DISCONTINUED | OUTPATIENT
Start: 2018-12-23 | End: 2018-12-27 | Stop reason: HOSPADM

## 2018-12-23 RX ORDER — QUETIAPINE FUMARATE 50 MG/1
50 TABLET, FILM COATED ORAL
Status: DISCONTINUED | OUTPATIENT
Start: 2018-12-23 | End: 2018-12-27 | Stop reason: HOSPADM

## 2018-12-23 RX ADMIN — LEVOTHYROXINE SODIUM 75 MCG: 75 TABLET ORAL at 08:39

## 2018-12-23 RX ADMIN — BENZTROPINE MESYLATE 0.5 MG: 0.5 TABLET ORAL at 03:29

## 2018-12-23 RX ADMIN — PAROXETINE 40 MG: 40 TABLET, FILM COATED ORAL at 08:39

## 2018-12-23 RX ADMIN — GABAPENTIN 600 MG: 300 CAPSULE ORAL at 08:38

## 2018-12-23 RX ADMIN — FOLIC ACID 1 MG: 1 TABLET ORAL at 08:39

## 2018-12-23 RX ADMIN — GABAPENTIN 600 MG: 300 CAPSULE ORAL at 16:27

## 2018-12-23 RX ADMIN — PROPRANOLOL HYDROCHLORIDE 10 MG: 10 TABLET ORAL at 16:24

## 2018-12-23 RX ADMIN — Medication 250 MG: at 08:38

## 2018-12-23 RX ADMIN — OLANZAPINE 5 MG: 5 TABLET, ORALLY DISINTEGRATING ORAL at 03:07

## 2018-12-23 RX ADMIN — OLANZAPINE 5 MG: 5 TABLET, ORALLY DISINTEGRATING ORAL at 08:40

## 2018-12-23 RX ADMIN — MULTIPLE VITAMINS W/ MINERALS TAB 1 TABLET: TAB at 08:39

## 2018-12-23 RX ADMIN — GABAPENTIN 600 MG: 300 CAPSULE ORAL at 20:48

## 2018-12-23 RX ADMIN — DIAZEPAM 5 MG: 5 TABLET ORAL at 16:24

## 2018-12-23 RX ADMIN — DIAZEPAM 10 MG: 5 TABLET ORAL at 12:38

## 2018-12-23 RX ADMIN — QUETIAPINE FUMARATE 50 MG: 50 TABLET ORAL at 21:59

## 2018-12-23 RX ADMIN — QUETIAPINE FUMARATE 25 MG: 25 TABLET ORAL at 20:48

## 2018-12-23 ASSESSMENT — ACTIVITIES OF DAILY LIVING (ADL)
DRESS: INDEPENDENT
HYGIENE/GROOMING: INDEPENDENT
ORAL_HYGIENE: INDEPENDENT

## 2018-12-23 NOTE — PLAN OF CARE
Continues in detox status on alcohol withdrawal protocol. MSSA 7 and 8. Continues to complain of high anxiety. Medicated x1 with Zyprexa with minimal results. Denies suicide ideation or thoughts of self harm. Medicated x1 with Valium 10 mg.

## 2018-12-23 NOTE — PROGRESS NOTES
12/23/18 1504   Patient Belongings   Did you bring any home meds/supplements to the hospital?  No   Patient Belongings other (see comments)   Belongings Search Yes   Clothing Search Yes   storage bin: shoes, boots    Box in med room: phone    Nothing to security    A               Admission:  I am responsible for any personal items that are not sent to the safe or pharmacy.  [facility] is not responsible for loss, theft or damage of any property in my possession.    Signature:  _________________________________ Date: _______  Time: _____                                              Staff Signature:  ____________________________ Date: ________  Time: _____      2nd Staff person, if patient is unable/unwilling to sign:    Signature: ________________________________ Date: ________  Time: _____     Discharge:  [facility] has returned all of my personal belongings:    Signature: _________________________________ Date: ________  Time: _____                                          Staff Signature:  ____________________________ Date: ________  Time: _____

## 2018-12-24 ENCOUNTER — APPOINTMENT (OUTPATIENT)
Dept: NUCLEAR MEDICINE | Facility: CLINIC | Age: 63
End: 2018-12-24
Attending: PHYSICIAN ASSISTANT
Payer: COMMERCIAL

## 2018-12-24 ENCOUNTER — APPOINTMENT (OUTPATIENT)
Dept: CARDIOLOGY | Facility: CLINIC | Age: 63
End: 2018-12-24
Attending: PHYSICIAN ASSISTANT
Payer: COMMERCIAL

## 2018-12-24 LAB — INTERPRETATION ECG - MUSE: NORMAL

## 2018-12-24 PROCEDURE — A9567 TECHNETIUM TC-99M AEROSOL: HCPCS | Performed by: PSYCHIATRY & NEUROLOGY

## 2018-12-24 PROCEDURE — 93306 TTE W/DOPPLER COMPLETE: CPT | Mod: 26 | Performed by: INTERNAL MEDICINE

## 2018-12-24 PROCEDURE — 34300033 ZZH RX 343: Performed by: PSYCHIATRY & NEUROLOGY

## 2018-12-24 PROCEDURE — A9540 TC99M MAA: HCPCS | Performed by: PSYCHIATRY & NEUROLOGY

## 2018-12-24 PROCEDURE — 27210210 NM LUNG SCAN VENTILATION AND PERFUSION

## 2018-12-24 PROCEDURE — 12800012 ZZH R&B CD MH INTERMEDIATE ADULT

## 2018-12-24 PROCEDURE — 99232 SBSQ HOSP IP/OBS MODERATE 35: CPT | Performed by: PHYSICIAN ASSISTANT

## 2018-12-24 PROCEDURE — 25000132 ZZH RX MED GY IP 250 OP 250 PS 637: Performed by: PSYCHIATRY & NEUROLOGY

## 2018-12-24 PROCEDURE — 93306 TTE W/DOPPLER COMPLETE: CPT

## 2018-12-24 PROCEDURE — 25000132 ZZH RX MED GY IP 250 OP 250 PS 637: Performed by: NURSE PRACTITIONER

## 2018-12-24 PROCEDURE — 99207 ZZC CDG-MDM COMPONENT: MEETS LOW - DOWN CODED: CPT | Performed by: PHYSICIAN ASSISTANT

## 2018-12-24 PROCEDURE — 99232 SBSQ HOSP IP/OBS MODERATE 35: CPT | Performed by: PSYCHIATRY & NEUROLOGY

## 2018-12-24 RX ORDER — OLANZAPINE 5 MG/1
5 TABLET ORAL
Status: COMPLETED | OUTPATIENT
Start: 2018-12-24 | End: 2018-12-24

## 2018-12-24 RX ADMIN — PAROXETINE 40 MG: 40 TABLET, FILM COATED ORAL at 08:43

## 2018-12-24 RX ADMIN — QUETIAPINE FUMARATE 50 MG: 50 TABLET ORAL at 20:46

## 2018-12-24 RX ADMIN — QUETIAPINE FUMARATE 25 MG: 25 TABLET ORAL at 16:36

## 2018-12-24 RX ADMIN — QUETIAPINE FUMARATE 25 MG: 25 TABLET ORAL at 08:43

## 2018-12-24 RX ADMIN — OLANZAPINE 5 MG: 5 TABLET, FILM COATED ORAL at 14:30

## 2018-12-24 RX ADMIN — MULTIPLE VITAMINS W/ MINERALS TAB 1 TABLET: TAB at 08:43

## 2018-12-24 RX ADMIN — PROPRANOLOL HYDROCHLORIDE 10 MG: 10 TABLET ORAL at 13:27

## 2018-12-24 RX ADMIN — LEVOTHYROXINE SODIUM 75 MCG: 75 TABLET ORAL at 08:43

## 2018-12-24 RX ADMIN — Medication 250 MG: at 08:43

## 2018-12-24 RX ADMIN — FOLIC ACID 1 MG: 1 TABLET ORAL at 08:43

## 2018-12-24 RX ADMIN — GABAPENTIN 600 MG: 300 CAPSULE ORAL at 20:46

## 2018-12-24 RX ADMIN — ACETAMINOPHEN 650 MG: 325 TABLET, FILM COATED ORAL at 20:46

## 2018-12-24 RX ADMIN — Medication 6.8 MCI.: at 15:34

## 2018-12-24 RX ADMIN — GABAPENTIN 600 MG: 300 CAPSULE ORAL at 13:27

## 2018-12-24 RX ADMIN — KIT FOR THE PREPARATION OF TECHNETIUM TC 99M PENTETATE 2 MCI.: 20 INJECTION, POWDER, LYOPHILIZED, FOR SOLUTION INTRAVENOUS; RESPIRATORY (INHALATION) at 15:29

## 2018-12-24 RX ADMIN — PROPRANOLOL HYDROCHLORIDE 10 MG: 10 TABLET ORAL at 10:57

## 2018-12-24 RX ADMIN — ACETAMINOPHEN 650 MG: 325 TABLET, FILM COATED ORAL at 12:39

## 2018-12-24 RX ADMIN — GABAPENTIN 600 MG: 300 CAPSULE ORAL at 08:43

## 2018-12-24 ASSESSMENT — ACTIVITIES OF DAILY LIVING (ADL)
DRESS: SCRUBS (BEHAVIORAL HEALTH)
LAUNDRY: WITH SUPERVISION
HYGIENE/GROOMING: INDEPENDENT
ORAL_HYGIENE: INDEPENDENT

## 2018-12-24 NOTE — PROGRESS NOTES
Pt states her initial dose of Zyprexa helped for only a short time (had received it 1430), but by 1630, she reports anxiety returning. Dr. Guerrero informed, Seroquel order will remain.

## 2018-12-24 NOTE — PROGRESS NOTES
[facility], [place]   Psychiatric Progress Note        Interim History:   The patient's care was discussed with the treatment team during the daily team meeting and/or staff's chart notes were reviewed.  Staff report patient rated anx at 8 and dep and 5 out of 10. No SI or DARRYL. No overt psychosis, yossi or confusion. Sleeping and eating well. Focused on PRN and exhibiting medication seeking behavior. No overt psychosis, yossi or confusion. Independent with ADL.     The patient was focused on PRNs but redirectable and receptive to proposed recommendations. Denied dep, SI and DARRYL. Described elevated anxiety and rumination. Slightly intrusive but stated that she slept well. Appetite is intact. Withdrawal subsided. No current cravings but fearful of relapsing if discharged to the community. No hallucinations or paranoia. Tolerating medications well.     Discussed medications and care plan.        Medications:       folic acid  1 mg Oral Daily     gabapentin  600 mg Oral TID     levothyroxine  75 mcg Oral Daily     multivitamin w/minerals  1 tablet Oral Daily     PARoxetine  40 mg Oral QAM     vitamin B1  250 mg Oral Daily          Allergies:     Allergies   Allergen Reactions     Contrast Dye Swelling          Labs:     Recent Results (from the past 24 hour(s))   EKG 12-lead, complete    Collection Time: 12/23/18  7:23 PM   Result Value Ref Range    Interpretation ECG Click View Image link to view waveform and result    CBC with platelets    Collection Time: 12/23/18  9:10 PM   Result Value Ref Range    WBC 8.0 4.0 - 11.0 10e9/L    RBC Count 3.86 3.8 - 5.2 10e12/L    Hemoglobin 11.7 11.7 - 15.7 g/dL    Hematocrit 37.3 35.0 - 47.0 %    MCV 97 78 - 100 fl    MCH 30.3 26.5 - 33.0 pg    MCHC 31.4 (L) 31.5 - 36.5 g/dL    RDW 15.4 (H) 10.0 - 15.0 %    Platelet Count 234 150 - 450 10e9/L   Basic metabolic panel    Collection Time: 12/23/18  9:10 PM   Result Value Ref Range    Sodium 143 133 - 144  mmol/L    Potassium 4.2 3.4 - 5.3 mmol/L    Chloride 108 94 - 109 mmol/L    Carbon Dioxide 27 20 - 32 mmol/L    Anion Gap 8 3 - 14 mmol/L    Glucose 131 (H) 70 - 99 mg/dL    Urea Nitrogen 19 7 - 30 mg/dL    Creatinine 0.90 0.52 - 1.04 mg/dL    GFR Estimate 68 >60 mL/min/[1.73_m2]    GFR Estimate If Black 79 >60 mL/min/[1.73_m2]    Calcium 8.5 8.5 - 10.1 mg/dL   Troponin I    Collection Time: 12/23/18  9:10 PM   Result Value Ref Range    Troponin I ES <0.015 0.000 - 0.045 ug/L   NT proBNP inpatient and ED    Collection Time: 12/23/18  9:10 PM   Result Value Ref Range    N-Terminal Pro BNP Inpatient 12 0 - 900 pg/mL   D dimer quantitative    Collection Time: 12/23/18  9:10 PM   Result Value Ref Range    D Dimer 0.9 (H) 0.0 - 0.50 ug/ml FEU          Psychiatric Examination:     Vitals:    12/23/18 2024 12/24/18 0441 12/24/18 0757 12/24/18 1139   BP: 118/80 106/54 113/79 120/69   BP Location: Left arm      Pulse: 92 74 90 90   Resp: 16 14  16   Temp: 97.6  F (36.4  C) 98.1  F (36.7  C) 96.6  F (35.9  C) 97.7  F (36.5  C)   TempSrc: Oral Oral Tympanic Tympanic   SpO2:       Weight:       Height:                                                      Weight is 173 lbs 0 oz  Body mass index is 27.1 kg/m .    Appearance: awake, alert, appeared as age stated, well groomed and no apparent distress  Attitude:  cooperative  Eye Contact:  good  Mood:  anxious  Affect:  mood congruent and reactive  Speech:  clear, coherent and normal prosody but slightly restless.   Psychomotor Behavior:  fidgeting  Throught Process:  linear and goal oriented  Associations:  no loose associations  Thought Content:  no evidence of suicidal ideation or homicidal ideation and no evidence of psychotic thought  Insight:  fair  Judgement:  intact  Oriented to:  time, person, and place  Attention Span and Concentration:  intact  Recent and Remote Memory:  intact         Precautions:     Behavioral Orders   Procedures     Code 1 - Restrict to Unit     Fall  precautions     Routine Programming     As clinically indicated     Status 15     Every 15 minutes.     Withdrawal precautions          Diagnoses:   Alcohol use disorder, severe;  Major depressive disorder, recurrent, without psychotic features;   History of anxiety.        Plan:     Medications:  -- Paxil: restarted and continued at 40 mg qday.   -- Gabapentin: restarted and continued at 600 mg TID.   -- The patient was placed on alcohol withdrawal protocol via MSSA with PRN Valium, Multivitamin, Folic acid and Thiamine.   -- Melatonin 3 mg qhs PRN for insomnia.   -- Seroquel 25 mg qid PRN and Vistaril 25-50 mg qid PRN for anxiety.   -- Seroquel 50 mg qhs PRN for insomnia.   -- Propranolol 10 mg tid PRN for restlessness.   -- may consider switching to PRN Zyprexa if restlessness persist (? Mild akathisia)  -- may consider switching antidepressant to mood stabilizer if yossi emerged.     Legal Status and Disposition:  -- volunt.   -- discharge will be granted once completed detox and established mood stabilization and safety in the community. CD assessment completed and was referred to residential CD treatment.

## 2018-12-24 NOTE — PROGRESS NOTES
"According to record, patient has had an allergic reaction to contrast dye.  She stated, \"That was a long time ago. I've had that since then and I was fine.\"  "

## 2018-12-24 NOTE — CONSULTS
Internal Medicine Consultation  [facility] Medicine Service    Kay Dunham MRN# [id number]   YOB: 1955 Age: 63 year old      Date of Admission: 12/19/2018    Primary care provider: Erick Hunter    Requesting Provider : Dr Taylor.     Reason for Consultation : Dyspnea.           Chief Complaint:      Dyspnea.     History of Present Illness:     History is obtained from the patient and medical record.     Kay Dunham is a 63 year old female with a history  of alcohol abuse, hypothyroidism, depression, and anxiety admitted to station 3A for detox from alcohol.   Admitted 12/19/2018.   Patient on diazepam MSSA protocol.  Alcohol withdrawal improving.  Last MSSA score 7--8.    RN called me tonight  to evaluate.  Ongoing shortness of breath.  Vitals reportedly stable.  No evidence of desaturation.  Patient on room air.  Patient seen and evaluated.    History limited as patient is vague in her description, poor historian.  Reports  feeling of shortness of breath for last several days, almost since admission, says it is worse with standing and walking.  Lying or sitting up does not make a difference.  Reports some chest pressure upper, bilateral chest.  No chest pain.  No cough.  Patient thinks it could be related to anxiety, stress.  Says after she takes Valium and another anxiety medication, she feels better for a couple hours before it comes back again.   Denies any diagnosis of heart attack, heart failure, COPD or asthma.  Denies smoking.  Prior  Workup including stress echo negative.   Denies any history of fever, chills, sore throat.  No nausea vomiting or pain abdomen.  No lightheadedness or dizziness.  Mild generalized weakness.  No calf pain or ankle edema.     No bowel or bladder concerns.                 Past Medical History:     Past Medical History:   Diagnosis Date     Thyroid disease    -Depression, anxiety  -Alcohol abuse          Past  Surgical History:     Past Surgical History:   Procedure Laterality Date     BACK SURGERY       ORTHOPEDIC SURGERY               Social History:     Social History     Socioeconomic History     Marital status:      Spouse name: Not on file     Number of children: Not on file     Years of education: Not on file     Highest education level: Not on file   Social Needs     Financial resource strain: Not on file     Food insecurity - worry: Not on file     Food insecurity - inability: Not on file     Transportation needs - medical: Not on file     Transportation needs - non-medical: Not on file   Occupational History     Not on file   Tobacco Use     Smoking status: Never Smoker   Substance and Sexual Activity     Alcohol use: Yes     Comment: 1.5 pints per day at least     Drug use: No     Sexual activity: Not on file   Other Topics Concern     Parent/sibling w/ CABG, MI or angioplasty before 65F 55M? Not Asked   Social History Narrative     Not on file             Family History:   Reviewed.   Family History   Problem Relation Age of Onset     Cancer Mother      Cancer Brother      Cancer Sister              Immunizations:     There is no immunization history on file for this patient.         Allergies:     Allergies   Allergen Reactions     Contrast Dye Swelling             Medications:     Prior to Admission medications    Medication Sig Start Date End Date Taking? Authorizing Provider   gabapentin (NEURONTIN) 300 MG capsule Take 1 capsule (300 mg) by mouth 3 times daily 12/21/18 1/20/19 Yes Luis Armando Drew MD   levothyroxine (SYNTHROID/LEVOTHROID) 75 MCG tablet Take 75 mcg by mouth daily   Yes Unknown, Entered By History   multivitamin w/minerals (THERA-VIT-M) tablet Take 1 tablet by mouth daily 12/22/18 1/21/19 Yes Luis Armando Drew MD   PARoxetine (PAXIL) 40 MG tablet Take 1 tablet (40 mg) by mouth every morning 12/21/18 1/20/19 Yes Luis Armando Drew MD   Thiamine HCl (VITAMIN B1) 500 MG TABS Take  "100 mg by mouth 3 times daily 18 Yes Luis Armando Drew MD   ZOLMitriptan (ZOMIG-ZMT) 5 MG ODT Take 5 mg by mouth daily as needed for migraine (at onset of head ache)     Unknown, Entered By History        Current Facility-Administered Medications   Medication     acetaminophen (TYLENOL) tablet 650 mg     alum & mag hydroxide-simethicone (MYLANTA ES/MAALOX  ES) suspension 30 mL     bisacodyl (DULCOLAX) Suppository 10 mg     diazepam (VALIUM) tablet 5-20 mg     folic acid (FOLVITE) tablet 1 mg     gabapentin (NEURONTIN) capsule 600 mg     levothyroxine (SYNTHROID/LEVOTHROID) tablet 75 mcg     magnesium hydroxide (MILK OF MAGNESIA) suspension 30 mL     melatonin tablet 3 mg     multivitamin w/minerals (THERA-VIT-M) tablet 1 tablet     PARoxetine (PAXIL) tablet 40 mg     polyethylene glycol (MIRALAX/GLYCOLAX) Packet 17 g     propranolol (INDERAL) tablet 10 mg     QUEtiapine (SEROquel) tablet 25 mg     QUEtiapine (SEROquel) tablet 50 mg     sennosides (SENOKOT) tablet 8.6 mg     thiamine tablet 250 mg            Review of Systems:   The 10 point Review of Systems is negative other than noted in the HPI           Physical Exam:       Blood pressure 118/80, pulse 92, temperature 97.6  F (36.4  C), temperature source Oral, resp. rate 16, height 1.702 m (5' 7\"), weight 78.5 kg (173 lb), SpO2 95 %.    Temp (24hrs), Av.2  F (36.2  C), Min:96.3  F (35.7  C), Max:98.5  F (36.9  C)      Wt Readings from Last 5 Encounters:   18 78.5 kg (173 lb)   18 77.1 kg (170 lb)       No intake or output data in the 24 hours ending 182    General: Alert, interactive, NAD.  Anxious   HEENT: AT/NC, anicteric, PERRL, Moist MM  Neck: Supple, no JVD or cervical LAD  Chest/Resp: Clear to auscultation bilaterally, no crackles or wheezes  Heart/CV: S1 S2 regular, no murmur. No pedal edema.   Abdomen/ GI: Soft, nontender, nondistended. +BS.  No rebound or guarding.  Extremities/MSK: distal pulses 2+ and warm.  " No calf tenderness   skin: Warm and dry, no jaundice or new rash on exposed areas  Neuro: Alert & oriented x 3, grossly intact, ambulatory.  Psych: Pleasant.           Data:     All laboratory data reviewed    LABS (Last four results)  CMP  Recent Labs   Lab 12/23/18 2110 12/21/18  0659 12/18/18  1648    144 145*   POTASSIUM 4.2  --  4.0   CHLORIDE 108  --  112*   CO2 27  --  28   ANIONGAP 8  --  5   *  --  88   BUN 19  --  21   CR 0.90  --  0.86   GFRESTIMATED 68  --  67   GFRESTBLACK 79  --  81   MART 8.5  --  7.8*   MAG  --   --  2.2   PHOS  --   --  3.5   PROTTOTAL  --   --  6.6*   ALBUMIN  --   --  3.3*   BILITOTAL  --   --  <0.1*   ALKPHOS  --   --  96   AST  --   --  21   ALT  --   --  27     CBC  Recent Labs   Lab 12/23/18 2110 12/18/18  1648   WBC 8.0 5.7   RBC 3.86 4.05   HGB 11.7 12.2   HCT 37.3 37.0   MCV 97 91   MCH 30.3 30.1   MCHC 31.4* 33.0   RDW 15.4* 15.0    261     INR  Recent Labs   Lab 12/18/18  1648   INR 0.95     Arterial Blood GasNo lab results found in last 7 days.    Recent Results (from the past 24 hour(s))   X-ray Chest 2 vws*    Narrative    XR CHEST 2 VW  12/23/2018 9:43 PM      HISTORY: sob    COMPARISON: None    FINDINGS: PA and lateral views of the chest upright. Partially  visualized cervical spine fusion hardware. Cardiomediastinal  silhouette is slightly enlarged. There is a large retrocardiac hiatal  hernia. 12 mm nodule is seen in the left upper lobe. There is no focal  airspace consolidation, pleural effusion, or pneumothorax.  No acute bony abnormality  .    Impression    IMPRESSION:   1. Large hiatal hernia.  2. 12 mm nodule in the left upper lung field. This may represent a  calcified granuloma; however, further evaluation with CT would be  beneficial.   3. No focal consolidation.    I have personally reviewed the examination and initial interpretation  and I agree with the findings.    JEREMIAH WALKER MD              Assessment and Recommendations:       Kay Dunham is a 63 year old female with a history of alcohol abuse, hypothyroidism, depression, and anxiety admitted to station 3A for detox from alcohol.   Admitted to 3A 12/19/2018  Today seen for ongoing dyspnea for ~3-4 days.       # Dyspnea: Vitals stable except mild tachycardia, intermittent, likely withdrawal, anxiety related.  No documented desaturation or hypoxemia.  Breathing fine on room air.   No significant prior cardiopulmonary history.  Non-smoker.  No history of blood clots.    Etiology unclear.  Routine labs checked, unremarkable.  EKG twelve-lead done, reviewed with cardiology fellow on call, nonspecific ST-T changes, low voltage.  No acute ST elevation or significant ST-T changes.  Troponin not elevated.  ProBNP normal.  Chest x-ray done, reviewed, as above.  No evidence of pneumonia.    Patient low to at best intermediate probability for PE however seems less likely given no definite chest pain, documented hypoxic, patient feels better after Valium.  No calf tenderness or unilateral leg swelling.  No other significant risk factors are present.  Although d-dimer is elevated, there is a concern for contrast allergy.  Will hold off CT chest PE protocol.  Continue to monitor clinically.   Given patient's prior workup, per CE, including a stress echo, CT chest etc unremarkable-possible that this is more related to her anxiety.    -- However could consider echocardiogram, transthoracic in the morning, if symptoms persist  --Consider appropriate workup for PE, only if other workup negative and patient continues to be symptomatic.  Needs to clarify contrast allergy.  If so, review with radiology for appropriate workup, procedure.  -- Anxiety tt per psychiatry.     Called RN back to discuss the plan.  According to RN, patient at current sleeping.  On room air.  Not reporting any worsening dyspnea or chest pain.  Work up so far reassuring.   Ct to monitor.   RN to page with any interval change.     Team  to darrin IM team in am  Will sign out to IM team in am.       Thank you for this interesting consultation.        Evan Willett MD   House physician.    (Internal Medicine)    12/23/2018

## 2018-12-24 NOTE — PROGRESS NOTES
Continues to complain of shortness of breath (worsened over the last week)  and anxiety.  O2 sat 95%.  Reported symptom was worse after eating and said she does have history of hiatal hernia. Also mentioned that she was prescribed Gabapentin 900mg TID and noticed increased anxiety when dose was reduced to 600mg TID a few months ago. On call MD ordered ECG.

## 2018-12-24 NOTE — PROGRESS NOTES
"Pt reports anxiety 8 of 10 and depression 5 of 10 today. Denies any suicidal ideation plans or intent. Endorses hopefulness about finding a treatment for her to go to; [facility]. Pt is highly anxious, frequently at the front of the nursing station requesting medications for anxiety \"how about valium, I'll be here until Wednesday\". Informed pt that the valium is based for symptomatic withdrawal and she is not having any obvious signs of withdrawal. Pt did receive prn propanolol x 2 with minimal help in alleviating her anxiety. She is now down to Nuclear Medicine for a VQ scan. Per Nuclear Medicine this test should take about 45 minutes. Pt given prn zyprexa x 1 about 30 minutes prior to leaving for the test.  "

## 2018-12-24 NOTE — PROGRESS NOTES
Pt now requesting door to door residential treatment.  Explained OOP costs for Pt to attend [facility].  Pt interested in checking with [facility] as well.  Will assist Pt with calling programs this AM.

## 2018-12-24 NOTE — PROGRESS NOTES
"           BRIEF INTERNAL MEDICINE PROGRESS NOTE     Kay Dunham  : 1955  MRN # [id number]  2018    ASSESSMENT & PLAN: Kay Dunham is a 63 year old female with a history of alcohol abuse, hypothyroidism, chronic pain, depression, and anxiety who was admitted to  on 18 for alcohol withdrawal. Medicine following since  for dyspnea.    Dyspnea - Present for past week. Occurs at all times. Improves with Valium and PRN anxiety medications. Chronic occasional substernal chest pain unchanged from baseline, no additional associated symptoms. Non-smoker. No significant cardiopulmonary history. VSS except for mild intermittent tachycardia. No hypoxia, breathing comfortably on RA. BMP, CBC, BNP wnl on . EKG () with non-specific ST-T changes, reviewed with Cardiology by Dr. Willett. Troponin negative on . D-Dimer 0.9 on . CXR () with large hiatal hernia, 12 mm nodule in HUGH, and no focal consolidations.  - Obtain VQ Scan for PE evaluation in setting of elevated d-dimer, possible immobility PTA, and contrast dye allergy. Low pre-test probability.  - Obtain Complete Transthoracic Echocardiogram   - RN staff to monitor O2 sats with routine vitals and contact Medicine if hypoxia develops  - If above testing negative, no further inpatient evaluation is warranted and patient can follow up with PCP for further OP evaluation if symptoms persist after discharge.  - OP follow up of pulmonary nodule    Medicine will follow up on results of TTE and VQ scan.    Sailaja Kaur PA-C  Hospitalist Service  [phone]    INTERVAL HISTORY:   Ongoing dyspnea which is present \"all the time.\" Symptoms improve when she is relaxed or has received Valium/PRN anxiety medications. Chronic occasional substernal chest pain unchanged from baseline. Denies URI symptoms.     Attempted to clarify contrast allergy - patient reports it occurred ~5 years ago and consistent of hives with sensation that she " "\"could not swallow.\" She is unsure if the allergy occurred with CT or MRI and notes it could have been related to other factors (medications, known pistachio allergy). She reportedly has had ~3 subsequent CT scans without premedication or issues.     PHYSICAL EXAM:  Blood pressure 113/79, pulse 90, temperature 96.6  F (35.9  C), temperature source Tympanic, resp. rate 14, height 1.702 m (5' 7\"), weight 78.5 kg (173 lb), SpO2 95 %.  GENERAL: Awake. Non-toxic appearing. NAD.  HEENT: NC/AT. Anicteric sclera. Mucous membranes moist.  CV: RRR. S1.S2. No appreciable murmurs.   RESPIRATORY: Respirations regular, even, and unlabored on RA. Lungs CTAB without wheezes, rales, or rhonchi.   EXTREMITIES: No peripheral edema. Warm & well perfused.  SKIN: No jaundice, rashes, or lesions.     LABS:  Reviewed    RECENT IMAGING/PROCEDURES:   Reviewed      "

## 2018-12-24 NOTE — PROGRESS NOTES
Brief Medicine Note    Medicine following for dyspnea.    Dyspnea - Present for past week. Occurs at all times. Non-smoker. No significant cardiopulmonary history. VSS except for mild intermittent tachycardia which is likely due to withdrawal/anxiety. No hypoxia, breathing comfortably on RA. BMP, CBC, BNP wnl on 12/23. EKG (12/23) with non-specific ST-T changes, reviewed with Cardiology by Dr. Willett. Troponin negative on 12/23. CXR (12/23) with large hiatal hernia, 12 mm nodule in HUGH, and no focal consolidations. D-Dimer 0.9 on 12/23. VQ scan (12/24) with no evidence of PE. TTE (12/24) unremarkable with EF 60-65%, normal RV fxn, no valvular abnormalities. Likely secondary to anxiety as improves with Valium/PRN anxiety medications.  - No further inpatient evaluation is warranted at this time.  - Management of anxiety per Psychiatry  - Follow up with PCP for pulmonary nodule and further evaluation if symptoms persist after discharge.    Medicine will sign off. Please page the on-call SKIP for any intercurrent medical issues which arise.    Sailaja Kaur PA-C  Hospitalist Service  [phone]

## 2018-12-25 PROCEDURE — 12800012 ZZH R&B CD MH INTERMEDIATE ADULT

## 2018-12-25 PROCEDURE — 25000132 ZZH RX MED GY IP 250 OP 250 PS 637: Performed by: PSYCHIATRY & NEUROLOGY

## 2018-12-25 PROCEDURE — 25000132 ZZH RX MED GY IP 250 OP 250 PS 637: Performed by: NURSE PRACTITIONER

## 2018-12-25 RX ORDER — QUETIAPINE FUMARATE 25 MG/1
25-50 TABLET, FILM COATED ORAL EVERY 6 HOURS PRN
Status: DISCONTINUED | OUTPATIENT
Start: 2018-12-25 | End: 2018-12-27 | Stop reason: HOSPADM

## 2018-12-25 RX ADMIN — ACETAMINOPHEN 650 MG: 325 TABLET, FILM COATED ORAL at 19:37

## 2018-12-25 RX ADMIN — FOLIC ACID 1 MG: 1 TABLET ORAL at 07:52

## 2018-12-25 RX ADMIN — GABAPENTIN 600 MG: 300 CAPSULE ORAL at 19:38

## 2018-12-25 RX ADMIN — PAROXETINE 40 MG: 40 TABLET, FILM COATED ORAL at 07:52

## 2018-12-25 RX ADMIN — MULTIPLE VITAMINS W/ MINERALS TAB 1 TABLET: TAB at 07:53

## 2018-12-25 RX ADMIN — ACETAMINOPHEN 650 MG: 325 TABLET, FILM COATED ORAL at 06:04

## 2018-12-25 RX ADMIN — PROPRANOLOL HYDROCHLORIDE 10 MG: 10 TABLET ORAL at 14:13

## 2018-12-25 RX ADMIN — Medication 250 MG: at 07:52

## 2018-12-25 RX ADMIN — QUETIAPINE FUMARATE 50 MG: 25 TABLET ORAL at 12:09

## 2018-12-25 RX ADMIN — GABAPENTIN 600 MG: 300 CAPSULE ORAL at 14:12

## 2018-12-25 RX ADMIN — QUETIAPINE FUMARATE 50 MG: 50 TABLET ORAL at 19:37

## 2018-12-25 RX ADMIN — PROPRANOLOL HYDROCHLORIDE 10 MG: 10 TABLET ORAL at 07:52

## 2018-12-25 RX ADMIN — GABAPENTIN 600 MG: 300 CAPSULE ORAL at 07:52

## 2018-12-25 RX ADMIN — QUETIAPINE FUMARATE 25 MG: 25 TABLET ORAL at 06:05

## 2018-12-25 RX ADMIN — LEVOTHYROXINE SODIUM 75 MCG: 75 TABLET ORAL at 07:52

## 2018-12-25 ASSESSMENT — ACTIVITIES OF DAILY LIVING (ADL)
HYGIENE/GROOMING: INDEPENDENT
DRESS: INDEPENDENT
LAUNDRY: WITH SUPERVISION
ORAL_HYGIENE: INDEPENDENT

## 2018-12-25 NOTE — PLAN OF CARE
Patient was out in the milieu and attending unit activities. She appears tense and restless. Pt endorses high anxiety, requested multiple PRN to help decrease anxiety. Pt was given PRN propanolol and PRN seroquel with reported relief. Pt denies any thoughts of self harm and is hopeful about going to treatment.

## 2018-12-26 ENCOUNTER — APPOINTMENT (OUTPATIENT)
Dept: GENERAL RADIOLOGY | Facility: CLINIC | Age: 63
End: 2018-12-26
Attending: SPECIALIST
Payer: COMMERCIAL

## 2018-12-26 LAB — INTERPRETATION ECG - MUSE: NORMAL

## 2018-12-26 PROCEDURE — 25000132 ZZH RX MED GY IP 250 OP 250 PS 637: Performed by: PSYCHIATRY & NEUROLOGY

## 2018-12-26 PROCEDURE — 25000132 ZZH RX MED GY IP 250 OP 250 PS 637: Performed by: NURSE PRACTITIONER

## 2018-12-26 PROCEDURE — 99238 HOSP IP/OBS DSCHRG MGMT 30/<: CPT | Performed by: PSYCHIATRY & NEUROLOGY

## 2018-12-26 PROCEDURE — 94640 AIRWAY INHALATION TREATMENT: CPT

## 2018-12-26 PROCEDURE — 40000275 ZZH STATISTIC RCP TIME EA 10 MIN

## 2018-12-26 PROCEDURE — 93010 ELECTROCARDIOGRAM REPORT: CPT | Performed by: INTERNAL MEDICINE

## 2018-12-26 PROCEDURE — 25000132 ZZH RX MED GY IP 250 OP 250 PS 637: Performed by: PHYSICIAN ASSISTANT

## 2018-12-26 PROCEDURE — 99231 SBSQ HOSP IP/OBS SF/LOW 25: CPT | Performed by: PHYSICIAN ASSISTANT

## 2018-12-26 PROCEDURE — 85379 FIBRIN DEGRADATION QUANT: CPT | Performed by: SPECIALIST

## 2018-12-26 PROCEDURE — 25000125 ZZHC RX 250: Performed by: SPECIALIST

## 2018-12-26 PROCEDURE — 12800012 ZZH R&B CD MH INTERMEDIATE ADULT

## 2018-12-26 PROCEDURE — 93005 ELECTROCARDIOGRAM TRACING: CPT

## 2018-12-26 PROCEDURE — 71045 X-RAY EXAM CHEST 1 VIEW: CPT

## 2018-12-26 PROCEDURE — 99207 ZZC CDG-MDM COMPONENT: MEETS LOW - DOWN CODED: CPT | Performed by: PHYSICIAN ASSISTANT

## 2018-12-26 RX ORDER — QUETIAPINE FUMARATE 50 MG/1
50 TABLET, FILM COATED ORAL
Qty: 30 TABLET | Refills: 0 | Status: SHIPPED | OUTPATIENT
Start: 2018-12-26 | End: 2019-09-26

## 2018-12-26 RX ORDER — PANTOPRAZOLE SODIUM 40 MG/1
40 TABLET, DELAYED RELEASE ORAL
Status: DISCONTINUED | OUTPATIENT
Start: 2018-12-27 | End: 2018-12-26

## 2018-12-26 RX ORDER — PROPRANOLOL HYDROCHLORIDE 10 MG/1
10 TABLET ORAL 3 TIMES DAILY PRN
Qty: 30 TABLET | Refills: 0 | Status: SHIPPED | OUTPATIENT
Start: 2018-12-26 | End: 2019-09-26

## 2018-12-26 RX ORDER — LANOLIN ALCOHOL/MO/W.PET/CERES
3 CREAM (GRAM) TOPICAL
Qty: 30 TABLET | Refills: 0 | Status: SHIPPED | OUTPATIENT
Start: 2018-12-26 | End: 2019-01-25

## 2018-12-26 RX ORDER — IPRATROPIUM BROMIDE AND ALBUTEROL SULFATE 2.5; .5 MG/3ML; MG/3ML
3 SOLUTION RESPIRATORY (INHALATION) EVERY 4 HOURS PRN
Status: DISCONTINUED | OUTPATIENT
Start: 2018-12-26 | End: 2018-12-27 | Stop reason: HOSPADM

## 2018-12-26 RX ORDER — FOLIC ACID 1 MG/1
1 TABLET ORAL DAILY
Qty: 30 TABLET | Refills: 0 | Status: SHIPPED | OUTPATIENT
Start: 2018-12-27 | End: 2019-01-26

## 2018-12-26 RX ORDER — QUETIAPINE FUMARATE 25 MG/1
25-50 TABLET, FILM COATED ORAL EVERY 6 HOURS PRN
Qty: 60 TABLET | Refills: 0 | Status: SHIPPED | OUTPATIENT
Start: 2018-12-26 | End: 2019-09-26

## 2018-12-26 RX ORDER — PANTOPRAZOLE SODIUM 40 MG/1
40 TABLET, DELAYED RELEASE ORAL
Status: DISCONTINUED | OUTPATIENT
Start: 2018-12-26 | End: 2018-12-27 | Stop reason: HOSPADM

## 2018-12-26 RX ADMIN — GABAPENTIN 600 MG: 300 CAPSULE ORAL at 14:26

## 2018-12-26 RX ADMIN — IPRATROPIUM BROMIDE AND ALBUTEROL SULFATE 3 ML: .5; 3 SOLUTION RESPIRATORY (INHALATION) at 03:25

## 2018-12-26 RX ADMIN — ACETAMINOPHEN 650 MG: 325 TABLET, FILM COATED ORAL at 16:38

## 2018-12-26 RX ADMIN — FOLIC ACID 1 MG: 1 TABLET ORAL at 08:10

## 2018-12-26 RX ADMIN — QUETIAPINE FUMARATE 50 MG: 25 TABLET ORAL at 15:07

## 2018-12-26 RX ADMIN — QUETIAPINE FUMARATE 50 MG: 25 TABLET ORAL at 03:01

## 2018-12-26 RX ADMIN — ACETAMINOPHEN 650 MG: 325 TABLET, FILM COATED ORAL at 03:42

## 2018-12-26 RX ADMIN — LEVOTHYROXINE SODIUM 75 MCG: 75 TABLET ORAL at 08:10

## 2018-12-26 RX ADMIN — QUETIAPINE FUMARATE 50 MG: 25 TABLET ORAL at 09:09

## 2018-12-26 RX ADMIN — Medication 250 MG: at 08:09

## 2018-12-26 RX ADMIN — PROPRANOLOL HYDROCHLORIDE 10 MG: 10 TABLET ORAL at 12:48

## 2018-12-26 RX ADMIN — ACETAMINOPHEN 650 MG: 325 TABLET, FILM COATED ORAL at 11:00

## 2018-12-26 RX ADMIN — GABAPENTIN 600 MG: 300 CAPSULE ORAL at 20:45

## 2018-12-26 RX ADMIN — GABAPENTIN 600 MG: 300 CAPSULE ORAL at 08:10

## 2018-12-26 RX ADMIN — PAROXETINE 40 MG: 40 TABLET, FILM COATED ORAL at 08:10

## 2018-12-26 RX ADMIN — PANTOPRAZOLE SODIUM 40 MG: 40 TABLET, DELAYED RELEASE ORAL at 16:37

## 2018-12-26 RX ADMIN — MULTIPLE VITAMINS W/ MINERALS TAB 1 TABLET: TAB at 08:09

## 2018-12-26 RX ADMIN — PROPRANOLOL HYDROCHLORIDE 10 MG: 10 TABLET ORAL at 06:51

## 2018-12-26 RX ADMIN — PROPRANOLOL HYDROCHLORIDE 10 MG: 10 TABLET ORAL at 16:37

## 2018-12-26 RX ADMIN — QUETIAPINE FUMARATE 50 MG: 50 TABLET ORAL at 20:45

## 2018-12-26 ASSESSMENT — ACTIVITIES OF DAILY LIVING (ADL)
DRESS: INDEPENDENT
ORAL_HYGIENE: INDEPENDENT
LAUNDRY: UNABLE TO COMPLETE
HYGIENE/GROOMING: INDEPENDENT

## 2018-12-26 NOTE — PROGRESS NOTES
"Patient reporting dyspnea and SOB. Reduced lung sounds in lower lobes, audible expiratory wheezing noted.  RR 18-20.  02 sats 91-93%.  Reporting 'aching' pain \"in ribs\" under right breast  House officer contacted.  Portable CXR and EKG done.  Duoneb administered.  PRN seroquel administered.      Subsequently, patient reported more ease in breathing.  02 sats 94-95%. RR 16-18. Per MD recommendation, patient encouraged to sleep with extra pillows to keep head and upper torso elevated.  Also, patient encouraged to remain in seated or with head elevated following meals.  Patient currently sleeping comfortably.  Will continue to monitor closely.  "

## 2018-12-26 NOTE — DISCHARGE SUMMARY
The patient is anticipated to be through detox over the weekend and to be discharged after a CD assessment is done.  This discharge summary is done in anticipation of it.      DISCHARGE DIAGNOSES:   1.  Alcohol use disorder, severe.   2.  Major depressive disorder, recurrent with psychotic features.      Please see detailed admission note by Dr. Drew on 12/20/2018.      HOSPITAL COURSE:  During the hospitalization, the patient was detoxed off alcohol using MSSA protocol and Valium.  She is continued on Paxil 40 mg, Gabapentin 600 mg 3 times a day.  During the hospitalization, the patient was seen by Internal Medicine consult.  Please see detailed note by Carey Aguila on 12/20/2018.  During the hospitalization, the patient had an uneventful detox.  She was continued on Synthroid 75 mg.  She had lab work done, which shows a normal comprehensive metabolic panel, calcium of 7.8, albumin of 3.3, protein of 6.6, cholesterol of 262, LDL cholesterol of 156, non-HDL was 176.  During the hospitalization, the patient initially wanted to do treatment, but she talked with case management and felt that she can only do outpatient treatment.  She and the  found an outpatient treatment.  After patient completes detoxification, she is anticipated to be discharged.      DISCHARGE MENTAL STATUS EXAMINATION:  The patient is alert, oriented x3.  Recent and remote memory, language, fund of knowledge are all adequate.  No loose associations.  The patient does not have any suicidal or homicidal ideation, plan or intent.  The patient is stable to be discharged after detox is done.  She is set up to do the [facility].            DISCHARGE MENTAL STATUS EXAMINATION:  The patient is alert, oriented x3.  Good fund of knowledge.  Good use of language.  Recent and remote memory, language, fund of knowledge are all adequate.  Euthymic mood congruent affect  Speech normal rate/rhythm linear tp no loose asso,The  patient does not have any active suicidal or homicidal ideation.  Does not have any auditory or visual hallucination.  Fair insight/judgment At this time, the patient was stable to be discharged.        Pt was not determined to not be a danger to himself or others. At the current time of discharge, the patient does not meet criteria for involuntary hospitalization. On the day of discharge, the patient reports that they do not have suicidal or homicidal ideation and would never hurt themselves or others. Steps taken to minimize risk include: assessing patient s behavior and thought process daily during hospital stay, discharging patient with adequate plan for follow up for mental and physical health and discussing safety plan of returning to the hospital should the patient ever have thoughts of harming themselves or others. Therefore, based on all available evidence including the factors cited above, the patient does not appear to be at imminent risk for self-harm, and is appropriate for outpatient level of care.     Educated about side effects/risk vs benefits /alternative including non treatment.Pt consented to be on medication.     .Total time spent on discharge summary more than 35 min  More than  20 min  planning, coordination of care, medication reconciliation and performance of physical exam on day of discharge.Care was coordinated with unit RN and unit therapist       Kay Dunham Medication Instructions MIKAELA:[id number]    Printed on:12/31/18 1218   Medication Information                      folic acid (FOLVITE) 1 MG tablet  Take 1 tablet (1 mg) by mouth daily             gabapentin (NEURONTIN) 300 MG capsule  Take 1 capsule (300 mg) by mouth 3 times daily             levothyroxine (SYNTHROID/LEVOTHROID) 75 MCG tablet  Take 75 mcg by mouth daily             melatonin 3 MG tablet  Take 1 tablet (3 mg) by mouth nightly as needed for sleep             multivitamin w/minerals (THERA-VIT-M) tablet  Take 1  tablet by mouth daily             PARoxetine (PAXIL) 40 MG tablet  Take 1 tablet (40 mg) by mouth every morning             propranolol (INDERAL) 10 MG tablet  Take 1 tablet (10 mg) by mouth 3 times daily as needed (restlessness or anxiety)             QUEtiapine (SEROQUEL) 25 MG tablet  Take 1-2 tablets (25-50 mg) by mouth every 6 hours as needed (anxiety)             QUEtiapine (SEROQUEL) 50 MG tablet  Take 1 tablet (50 mg) by mouth nightly as needed (sleeplessness)             Thiamine HCl (VITAMIN B1) 500 MG TABS  Take 100 mg by mouth 3 times daily             ZOLMitriptan (ZOMIG-ZMT) 5 MG ODT  Take 5 mg by mouth daily as needed for migraine (at onset of head ache)              [address] [address]  [phone]         LUISA MIGUEL MD             D: 2018   T: 2018   MT: ANDRAE      Name:     VISH DELONG   MRN:      [id number]        Account:        [id number]   :      1955           Admit Date:     2018                                  Discharge Date:       Document: [id number]

## 2018-12-26 NOTE — PLAN OF CARE
Pt presents with tense affect, and is very anxious today. At times pt appears hypomanic, with rapid speech and agitated behaviors. Pt seems to calm after having propranolol. Pt reports continued epigastric pain, particularly after eating. Writer suggested pt eat small, frequent meals to combat this. Pt has extra pillows to prop self up when resting, as lying flat exacerbates pain and dyspnea. Pt will be discharged to [facility] via their transport tomorrow at 1400. No other concerns at this time. Nursing will continue to monitor and assess.

## 2018-12-26 NOTE — DISCHARGE SUMMARY
Psychiatric Discharge Summary    Kay Dunham MRN# [id number]   Age: 63 year old YOB: 1955     Date of Admission:  12/19/2018  Date of Discharge:  Tomorrow 12/27/2018  Admitting Physician:  Victoriano Guerrero MD  Discharge Physician:  Victoriano Guerrero MD         Event Leading to Hospitalization:     She was sober for 6 years and relapsed a year and a half ago.  She was taking care of her mother who has since passed away.  Alcohol is her drug of choice.  She has tolerance, withdrawal, progressive use with loss of control, tried to quit unsuccessfully despite negative consequences, strained her relationships.  She does not use any drugs, does not smoke, does not jeter.  She takes Paxil for depression and anxiety.  She does not have any suicidal or homicidal ideation.  Does not have auditory or visual hallucinations.  She also has history of being on Concerta.      Never psychiatrically hospitalized.  She was in 1 chemical dependency treatment in the past.  She has had 5 back surgeries and she takes gabapentin for it.  She also has migraines and hypothyroidism.  She has right abdominal surgery and hysterectomy. Mother, father and sister have alcoholism.  No family mental history. Born in [place], good childhood, no abuse.       See Admission note by Luis Armando Drew MD on 12/20/2018 for additional details.          Diagnoses:     Alcohol use disorder, severe;  Major depressive disorder, recurrent, without psychotic features; R/o Bipolar disorder.   Historical diagnosis of Anxiety disorder.         Labs:     Recent Results (from the past 336 hour(s))   Alcohol ethyl    Collection Time: 12/18/18  4:48 PM   Result Value Ref Range    Ethanol g/dL 0.17 (H) <0.01 g/dL   CBC with platelets differential    Collection Time: 12/18/18  4:48 PM   Result Value Ref Range    WBC 5.7 4.0 - 11.0 10e9/L    RBC Count 4.05 3.8 - 5.2 10e12/L    Hemoglobin 12.2 11.7 - 15.7 g/dL    Hematocrit 37.0 35.0 - 47.0 %    MCV 91 78 -  100 fl    MCH 30.1 26.5 - 33.0 pg    MCHC 33.0 31.5 - 36.5 g/dL    RDW 15.0 10.0 - 15.0 %    Platelet Count 261 150 - 450 10e9/L    Diff Method Automated Method     % Neutrophils 44.8 %    % Lymphocytes 42.6 %    % Monocytes 8.4 %    % Eosinophils 3.7 %    % Basophils 0.3 %    % Immature Granulocytes 0.2 %    Nucleated RBCs 0 0 /100    Absolute Neutrophil 2.6 1.6 - 8.3 10e9/L    Absolute Lymphocytes 2.4 0.8 - 5.3 10e9/L    Absolute Monocytes 0.5 0.0 - 1.3 10e9/L    Absolute Eosinophils 0.2 0.0 - 0.7 10e9/L    Absolute Basophils 0.0 0.0 - 0.2 10e9/L    Abs Immature Granulocytes 0.0 0 - 0.4 10e9/L    Absolute Nucleated RBC 0.0    Comprehensive metabolic panel    Collection Time: 12/18/18  4:48 PM   Result Value Ref Range    Sodium 145 (H) 133 - 144 mmol/L    Potassium 4.0 3.4 - 5.3 mmol/L    Chloride 112 (H) 94 - 109 mmol/L    Carbon Dioxide 28 20 - 32 mmol/L    Anion Gap 5 3 - 14 mmol/L    Glucose 88 70 - 99 mg/dL    Urea Nitrogen 21 7 - 30 mg/dL    Creatinine 0.86 0.52 - 1.04 mg/dL    GFR Estimate 67 >60 mL/min/1.7m2    GFR Estimate If Black 81 >60 mL/min/1.7m2    Calcium 7.8 (L) 8.5 - 10.1 mg/dL    Bilirubin Total <0.1 (L) 0.2 - 1.3 mg/dL    Albumin 3.3 (L) 3.4 - 5.0 g/dL    Protein Total 6.6 (L) 6.8 - 8.8 g/dL    Alkaline Phosphatase 96 40 - 150 U/L    ALT 27 0 - 50 U/L    AST 21 0 - 45 U/L   INR    Collection Time: 12/18/18  4:48 PM   Result Value Ref Range    INR 0.95 0.86 - 1.14   Magnesium    Collection Time: 12/18/18  4:48 PM   Result Value Ref Range    Magnesium 2.2 1.6 - 2.3 mg/dL   Phosphorus    Collection Time: 12/18/18  4:48 PM   Result Value Ref Range    Phosphorus 3.5 2.5 - 4.5 mg/dL   Lipase    Collection Time: 12/18/18  4:48 PM   Result Value Ref Range    Lipase 206 73 - 393 U/L   GGT    Collection Time: 12/20/18  7:59 AM   Result Value Ref Range    GGT 15 0 - 40 U/L   Lipid panel    Collection Time: 12/20/18  7:59 AM   Result Value Ref Range    Cholesterol 262 (H) <200 mg/dL    Triglycerides 100  <150 mg/dL    HDL Cholesterol 86 >49 mg/dL    LDL Cholesterol Calculated 156 (H) <100 mg/dL    Non HDL Cholesterol 176 (H) <130 mg/dL   TSH with free T4 reflex and/or T3 as indicated    Collection Time: 12/20/18  7:59 AM   Result Value Ref Range    TSH 2.29 0.40 - 4.00 mU/L   Folate    Collection Time: 12/20/18  7:59 AM   Result Value Ref Range    Folate 15.1 >5.4 ng/mL   Vitamin B12    Collection Time: 12/20/18  7:59 AM   Result Value Ref Range    Vitamin B12 313 193 - 986 pg/mL   Sodium    Collection Time: 12/21/18  6:59 AM   Result Value Ref Range    Sodium 144 133 - 144 mmol/L   EKG 12-lead, complete    Collection Time: 12/23/18  7:23 PM   Result Value Ref Range    Interpretation ECG Click View Image link to view waveform and result    CBC with platelets    Collection Time: 12/23/18  9:10 PM   Result Value Ref Range    WBC 8.0 4.0 - 11.0 10e9/L    RBC Count 3.86 3.8 - 5.2 10e12/L    Hemoglobin 11.7 11.7 - 15.7 g/dL    Hematocrit 37.3 35.0 - 47.0 %    MCV 97 78 - 100 fl    MCH 30.3 26.5 - 33.0 pg    MCHC 31.4 (L) 31.5 - 36.5 g/dL    RDW 15.4 (H) 10.0 - 15.0 %    Platelet Count 234 150 - 450 10e9/L   Basic metabolic panel    Collection Time: 12/23/18  9:10 PM   Result Value Ref Range    Sodium 143 133 - 144 mmol/L    Potassium 4.2 3.4 - 5.3 mmol/L    Chloride 108 94 - 109 mmol/L    Carbon Dioxide 27 20 - 32 mmol/L    Anion Gap 8 3 - 14 mmol/L    Glucose 131 (H) 70 - 99 mg/dL    Urea Nitrogen 19 7 - 30 mg/dL    Creatinine 0.90 0.52 - 1.04 mg/dL    GFR Estimate 68 >60 mL/min/[1.73_m2]    GFR Estimate If Black 79 >60 mL/min/[1.73_m2]    Calcium 8.5 8.5 - 10.1 mg/dL   Troponin I    Collection Time: 12/23/18  9:10 PM   Result Value Ref Range    Troponin I ES <0.015 0.000 - 0.045 ug/L   NT proBNP inpatient and ED    Collection Time: 12/23/18  9:10 PM   Result Value Ref Range    N-Terminal Pro BNP Inpatient 12 0 - 900 pg/mL   D dimer quantitative    Collection Time: 12/23/18  9:10 PM   Result Value Ref Range    D Dimer  0.9 (H) 0.0 - 0.50 ug/ml FEU   EKG 12-lead, tracing only    Collection Time: 12/26/18  3:15 AM   Result Value Ref Range    Interpretation ECG Click View Image link to view waveform and result      Results for orders placed or performed during the hospital encounter of 12/19/18   X-ray Chest 2 vws*    Narrative    XR CHEST 2 VW  12/23/2018 9:43 PM      HISTORY: sob    COMPARISON: None    FINDINGS: PA and lateral views of the chest upright. Partially  visualized cervical spine fusion hardware. Cardiomediastinal  silhouette is slightly enlarged. There is a large retrocardiac hiatal  hernia. 12 mm nodule is seen in the left upper lobe. There is no focal  airspace consolidation, pleural effusion, or pneumothorax.  No acute bony abnormality  .    Impression    IMPRESSION:   1. Large hiatal hernia.  2. 12 mm nodule in the left upper lung field. This may represent a  calcified granuloma; however, further evaluation with CT would be  beneficial.   3. No focal consolidation.    I have personally reviewed the examination and initial interpretation  and I agree with the findings.    JEREMIAH WALKER MD   NM Lung Scan Ventilation and Perfusion    Narrative    Examination:NM LUNG SCAN VENTILATION AND PERFUSION, 12/24/2018 3:55 PM      Indication:  PE suspected, low pretest prob; SOB/Dyspnea with elevated  D-Dimer and possible immobility PTA. Low pre-test probability. Has  contrast dye allergy.     Additional Information: none    Technique:    The patient received 2 mCi of Tc-99m DTPA aerosol for ventilation and  6.8 mCi of Tc-99m MAA for perfusion. Multiple images were obtained of  the lungs in Anterior, posterior, EDUARDO, RPO, LPO, and  Divehi projections.    Comparison: X-ray from 12/23/2018    Findings:    The ventilation study demonstrates symmetric radiotracer uptake.    The perfusion study demonstrates no perfusion defect.      Impression    Impression:    No evidence of pulmonary embolism.    I have personally reviewed the  examination and initial interpretation  and I agree with the findings.    KAYCEE RIVERO MD   XR Chest Port 1 View    Narrative    XR CHEST PORT 1 VW  12/26/2018 3:40 AM      HISTORY: SOB    COMPARISON: 12/23/2018    FINDINGS: Portable PA view of the chest upright. Cardiac silhouette is  unchanged. There is a large hiatal hernia. Hazy left retrocardiac  opacities. No definite focal consolidation. No pneumothorax.      Impression    IMPRESSION: Poorly visualized left retrocardiac opacities likely  represent atelectasis with questionable left pleural effusion. Large  hiatal hernia unchanged.    I have personally reviewed the examination and initial interpretation  and I agree with the findings.    CHRISTINE PA MD            Consults:   Consultation during this admission received from internal medicine.  No medical intervention was indicated.      Kay Dunham is a 63 year old female with a history of alcohol abuse, hypothyroidism, depression, and anxiety admitted to station 3A for detox from alcohol.       # Alcohol withdrawal, hx of alcohol abuse - MSSA 13 this shift.  No hx of withdrawal seizures. Tangential and very anxious.  - Continue MSSA   - Start high dose thiamine 500mg TID x 2 days, then 250mg daily for 5 days   - Continue folvite, multi-vites  - Further management per Psychiatry      # Anxiety, depression - Last seen by PCP on 12/5 for anxiety, Paxil increased to 40mg daily.  Noted that she is not currently under the care of a psychiatrist or counselor.      - Management per Psychiatry      # Hypothyroidism - TSH wnl this admission. On Synthroid 75mcg PTA.   - Continue PTA Synthroid     # Hypernatremia - Na 145 on admission.  Likely 2/2 dehydration from alcohol ingestion.  Not currently on any diuretics.      - Encourage PO fluids   - Recheck BMP in am      # Chronic pain - Reports hx of multiple ortho-spine surgeries.  Denies acute pain on exam.  On Gabapentin 600mg TID prior to admission.    - Continue  PTA Gabapentin   - Soft care mattress      # Constipation - Reports that last BM was yesterday prior to admission.   - Senna and Miralax PRN       Medicine will follow labs peripherally and sign off, no further recommendations at this time.  Please feel free to reconsult if patient's symptoms worsen or if new problems arise.  Thank you for the opportunity to care for this patient.       Noelle Rodriguez CNP    Consult Note on 12/23:  Kay Dunham is a 63 year old female with a history of alcohol abuse, hypothyroidism, depression, and anxiety admitted to station 3A for detox from alcohol.   Admitted to 3A 12/19/2018  Today seen for ongoing dyspnea for ~3-4 days.         # Dyspnea: Vitals stable except mild tachycardia, intermittent, likely withdrawal, anxiety related.  No documented desaturation or hypoxemia.  Breathing fine on room air.   No significant prior cardiopulmonary history.  Non-smoker.  No history of blood clots.     Etiology unclear.  Routine labs checked, unremarkable.  EKG twelve-lead done, reviewed with cardiology fellow on call, nonspecific ST-T changes, low voltage.  No acute ST elevation or significant ST-T changes.  Troponin not elevated.  ProBNP normal.  Chest x-ray done, reviewed, as above.  No evidence of pneumonia.     Patient low to at best intermediate probability for PE however seems less likely given no definite chest pain, documented hypoxic, patient feels better after Valium.  No calf tenderness or unilateral leg swelling.  No other significant risk factors are present.  Although d-dimer is elevated, there is a concern for contrast allergy.  Will hold off CT chest PE protocol.  Continue to monitor clinically.   Given patient's prior workup, per CE, including a stress echo, CT chest etc unremarkable-possible that this is more related to her anxiety.     -- However could consider echocardiogram, transthoracic in the morning, if symptoms persist  --Consider appropriate workup for PE, only if  other workup negative and patient continues to be symptomatic.  Needs to clarify contrast allergy.  If so, review with radiology for appropriate workup, procedure.  -- Anxiety tt per psychiatry.      Called RN back to discuss the plan.  According to RN, patient at current sleeping.  On room air.  Not reporting any worsening dyspnea or chest pain.  Work up so far reassuring.   Ct to monitor.   RN to page with any interval change.      Team to darrni IM team in am  Will sign out to IM team in am.      Thank you for this interesting consultation.       Evan Willett MD   House physician.        Hospital Course:   Kay Dunham was admitted to Detox Unit with Luis Armando Drew MD who formulated the care plan. The patient was admitted as a voluntary patient. The patient was placed under status 15 (15 minute checks) to ensure patient safety. The patient completed CD assessment and sought referral to [facility]. The patient was discharge there after completing detox. Patient  did not require seclusion or administration of emergency medications to manage behavior.    The following medication changes took place:   Medications:  -- Paxil: restarted and continued at 40 mg qday.   -- Gabapentin: restarted and continued at 600 mg TID.   -- The patient was placed on alcohol withdrawal protocol via MSSA with PRN Valium, Multivitamin, Folic acid and Thiamine.   -- Melatonin 3 mg qhs PRN for insomnia.   -- Seroquel 25 mg qid PRN and Vistaril 25-50 mg qid PRN for anxiety.   -- Seroquel 50 mg qhs PRN for insomnia.   -- Propranolol 10 mg tid PRN for restlessness.   -- may consider switching to PRN Zyprexa if restlessness persist (? Mild akathisia)  -- may consider switching antidepressant to mood stabilizer if yossi emerged.     The patient tolerated medications well. Reported mood symptoms subsided. The patient was active on the unit. The patient was social, engaged and attended groups. No confusion or psychosis noted. Sleep fluctuated  greatly and a times patient appeared to be hyper verbal and restless. Maisha was suspected, but symptoms subsided with medications adjustments. The patient maintained denial of SI, HI and DARRYL. Appetite was intact. The patient was compliant with medications and care.     Kay Dunham will be [facility] to [facility] tomorrow. At the time of this encounter, Kay Dunham was determined to not be a danger to herself or others and symptoms did not meet criteria for involuntary hospitalization.  The patient denied depression, anxiety, racing thoughts and irritability. The patient denied hallucinations and paranoia. The patient was future oriented and denied SI, DARRYL and HI. The patient noted tolerating medications well.    Steps taken to minimize risk include: assessing patient s behavior and thought process daily during hospital stay, discharging patient with adequate plan for follow up for mental and physical health, and discussing safety plan of returning to the hospital or calling 911, should the patient ever has thoughts of harming self or others. Therefore, based on all available evidence including the factors cited above, the patient does not appear to be at imminent risk for self-harm, and is appropriate for outpatient level of care.  The patient agreed to continue medications and outpatient care.     Because this patient meets criteria for an Alcohol Use Disorder, I performed the following brief intervention on the date of this note:   1) Expressed concern that the patient is drinking at unhealthy levels known to increase their risk of alcohol related problems   2) Gave feedback linking alcohol use and health, including personalized feedback explaining how alcohol use can interact with their medical and/or psychiatric problems, and with prescribed medications.   3) Advised patient to abstain.         Discharge Medications:        Review of your medicines      START taking      Dose / Directions   folic acid 1 MG  tablet  Commonly known as:  FOLVITE      Dose:  1 mg  Start taking on:  12/27/2018  Take 1 tablet (1 mg) by mouth daily  Quantity:  30 tablet  Refills:  0     melatonin 3 MG tablet  Used for:  Psychophysiological insomnia      Dose:  3 mg  Take 1 tablet (3 mg) by mouth nightly as needed for sleep  Quantity:  30 tablet  Refills:  0     multivitamin w/minerals tablet      Dose:  1 tablet  Take 1 tablet by mouth daily  Quantity:  30 tablet  Refills:  0     propranolol 10 MG tablet  Commonly known as:  INDERAL  Used for:  Depression with anxiety      Dose:  10 mg  Take 1 tablet (10 mg) by mouth 3 times daily as needed (restlessness or anxiety)  Quantity:  30 tablet  Refills:  0     * QUEtiapine 25 MG tablet  Commonly known as:  SEROquel  Used for:  Depression with anxiety      Dose:  25-50 mg  Take 1-2 tablets (25-50 mg) by mouth every 6 hours as needed (anxiety)  Quantity:  60 tablet  Refills:  0     * QUEtiapine 50 MG tablet  Commonly known as:  SEROquel  Used for:  Depression with anxiety      Dose:  50 mg  Take 1 tablet (50 mg) by mouth nightly as needed (sleeplessness)  Quantity:  30 tablet  Refills:  0     Thiamine HCl 500 MG Tabs      Dose:  100 mg  Take 100 mg by mouth 3 times daily  Quantity:  90 tablet  Refills:  1         * This list has 2 medication(s) that are the same as other medications prescribed for you. Read the directions carefully, and ask your doctor or other care provider to review them with you.            CONTINUE these medicines which may have CHANGED, or have new prescriptions. If we are uncertain of the size of tablets/capsules you have at home, strength may be listed as something that might have changed.      Dose / Directions   gabapentin 300 MG capsule  Commonly known as:  NEURONTIN  This may have changed:      medication strength    how much to take      Dose:  300 mg  Take 1 capsule (300 mg) by mouth 3 times daily  Quantity:  90 capsule  Refills:  0        CONTINUE these medicines which  have NOT CHANGED      Dose / Directions   levothyroxine 75 MCG tablet  Commonly known as:  SYNTHROID/LEVOTHROID      Dose:  75 mcg  Take 75 mcg by mouth daily  Refills:  0     PARoxetine 40 MG tablet  Commonly known as:  PAXIL      Dose:  40 mg  Take 1 tablet (40 mg) by mouth every morning  Quantity:  30 tablet  Refills:  0     ZOLMitriptan 5 MG ODT  Commonly known as:  ZOMIG-ZMT      Dose:  5 mg  Take 5 mg by mouth daily as needed for migraine (at onset of head ache)  Refills:  0        STOP taking    hydrOXYzine 25 MG tablet  Commonly known as:  ATARAX              Where to get your medicines      These medications were sent to [facility] [address]  [address] [address]    Phone:  [phone]     folic acid 1 MG tablet    gabapentin 300 MG capsule    melatonin 3 MG tablet    multivitamin w/minerals tablet    PARoxetine 40 MG tablet    propranolol 10 MG tablet    QUEtiapine 25 MG tablet    QUEtiapine 50 MG tablet    Thiamine HCl 500 MG Tabs            Psychiatric and Physical Examinations:   Appearance:  awake, alert, adequately groomed, appeared as age stated and no apparent distress  Attitude:  cooperative  Eye Contact:  good  Mood:  better  Affect:  appropriate and in normal range and intensity is normal  Speech:  clear, coherent and normal prosody  Psychomotor Behavior:  no evidence of tardive dyskinesia, dystonia, or tics and intact station, gait and muscle tone  Thought Process:  linear and goal oriented  Associations:  no loose associations  Thought Content:  no evidence of suicidal ideation or homicidal ideation and no evidence of psychotic thought  Insight:  fair  Judgment:  intact  Oriented to:  time, person, and place  Attention Span and Concentration:  intact  Recent and Remote Memory:  intact  Language and Fund of Knowledge: appropriate  Muscle Strength and Tone: normal  Gait and Station: Normal  Vitals:    12/26/18 0249 12/26/18 0350  12/26/18 0649 12/26/18 0735   BP: 126/81 120/76 122/81 123/73   Pulse: 102 103 90 82   Resp: 18 18 18 16   Temp:  96.7  F (35.9  C)  97.7  F (36.5  C)   TempSrc:  Oral  Oral   SpO2: 92% 94%     Weight:       Height:              Discharge Plan:     Follow up Appointment:  [address] [address], [place] [address]  [phone]     Resources:   AA/NA and Sponsors are excellent resources for support and you can find one at any support group meeting.   http://www.aastpaul.org (then click meetings) This for the [facility] AA meetings  http://a[place].org/meetings/   This is for the [facility] AA meetings  http://www.nam[place].us (then click find a  Meeting) This is for [facility] NA   "[employer] Recovery - self management for addiction recovery:  www.smartrecovery.org  Pathways ~ A Health Crisis Resource & Support Center:  335.600.8680.  http://www.harmreduction.org  [facility] 417-627-2443  Support Group:  AA/NA and Sponsor/support.  National [place] on Mental Illness (www.[place].colin.org): 146.534.1968 or 280-109-9434.  Alcoholics Anonymous (www.alcoholics-anonymous.org): Check your phone book for your local chapter.  Suicide Awareness Voices of Education (SAVE) (www.save.org): 478-176-PUGB (8183)  National Suicide Prevention Line (www.mentalhealthmn.org): 403-202-XJRT (4525)  Mental Health Consumer/Survivor Network of [place] (www.mhcsn.net): 445.515.9141 or 936-716-5861  Mental Health Association of [place] (www.mentalhealth.org): 437.211.8787 or 571-303-0276   Substance Abuse and Mental Health Services (www.samhsa.gov)     [place] Recovery Connection (MRC)  [facility] connects people seeking recovery to resources that help foster and sustain long-term recovery.  Whether you are seeking resources for treatment, transportation, housing, job training, education, health care or other pathways to recovery, [facility] is a great place to start.   [phone].  Www.[facility].org    Attestation:  The patient has been seen and evaluated by Victoriano law MD

## 2018-12-26 NOTE — PROGRESS NOTES
"Brief Medicine Note    Patient seen in follow up from [facility] visit overnight for dyspnea which has been an ongoing issue since 3A admission. Currently reports she is \"feeling calm\" without shortness of breath. Primary complaint is of sinus headache. Reports acid reflux symptoms, especially post-prandially.    /70   Pulse 91   Temp 98.4  F (36.9  C) (Oral)   Resp 16   Ht 1.702 m (5' 7\")   Wt 78.5 kg (173 lb)   SpO2 95%   BMI 27.10 kg/m     General: Awake. Non-toxic appearing. NAD.  CV: RRR. No appreciable murmurs.  Respiratory: Normal effort on RA. Lungs CTAB without wheezes, rales, or rhonchi.  Extremities: No peripheral edema or calf tenderness. Warm and well perfused.    Assessment and Plan:  #Dyspnea  #Hiatal Hernia  Persistent SOB for past week. Non-smoker. No significant cardiopulmonary history. VSS. No hypoxia, breathing comfortably on RA. BMP, CBC, BNP wnl on 12/23. EKG (12/23) with non-specific ST-T changes, reviewed with Cardiology by Dr. Willett. Troponin negative on 12/23. CXR (12/23) with large hiatal hernia, 12 mm nodule in HUGH, and no focal consolidations. D-Dimer 0.9 on 12/23. VQ scan (12/24) with no evidence of PE. TTE (12/24) unremarkable with EF 60-65%, normal RV fxn, no valvular abnormalities. Likely secondary to anxiety as improves with Valium/PRN anxiety medications. Large hiatal hernia may be contributing as well.  - Start daily PPI for reflux symptoms in setting of hiatal hernia  - No further inpatient evaluation for SOB/hiatal hernia is warranted at this time.  - Management of anxiety per Psychiatry  - Follow up with PCP for OP evaluation/management of pulmonary nodule and hiatal hernia.    Medicine will sign off. Please page the on-call SKIP for any intercurrent medical issues which arise.    Sailaja Kaur PA-C  Hospitalist Service  [phone]        "

## 2018-12-26 NOTE — PROGRESS NOTES
On call hospitalist note    Called for SOB  cxr showed huge hiatal hernia   EKG neg   Had extensive work up with in 48 hours including echo and ventilation perfusion scans which were neg   Recommended to keep HOB elevated and not to lay down atleast for 2 hours after eating     Nikko Ring MD on 12/26/2018 at 3:55 AM

## 2018-12-27 VITALS
HEIGHT: 67 IN | RESPIRATION RATE: 16 BRPM | TEMPERATURE: 99.4 F | HEART RATE: 98 BPM | WEIGHT: 173 LBS | BODY MASS INDEX: 27.15 KG/M2 | SYSTOLIC BLOOD PRESSURE: 114 MMHG | DIASTOLIC BLOOD PRESSURE: 69 MMHG | OXYGEN SATURATION: 95 %

## 2018-12-27 PROCEDURE — 94640 AIRWAY INHALATION TREATMENT: CPT | Mod: 76

## 2018-12-27 PROCEDURE — 25000132 ZZH RX MED GY IP 250 OP 250 PS 637: Performed by: NURSE PRACTITIONER

## 2018-12-27 PROCEDURE — 94640 AIRWAY INHALATION TREATMENT: CPT

## 2018-12-27 PROCEDURE — 25000132 ZZH RX MED GY IP 250 OP 250 PS 637: Performed by: PSYCHIATRY & NEUROLOGY

## 2018-12-27 PROCEDURE — 25000132 ZZH RX MED GY IP 250 OP 250 PS 637: Performed by: PHYSICIAN ASSISTANT

## 2018-12-27 PROCEDURE — 25000125 ZZHC RX 250: Performed by: SPECIALIST

## 2018-12-27 RX ORDER — ALBUTEROL SULFATE 90 UG/1
2 AEROSOL, METERED RESPIRATORY (INHALATION) 4 TIMES DAILY PRN
Status: DISCONTINUED | OUTPATIENT
Start: 2018-12-27 | End: 2018-12-27 | Stop reason: HOSPADM

## 2018-12-27 RX ADMIN — GABAPENTIN 600 MG: 300 CAPSULE ORAL at 08:33

## 2018-12-27 RX ADMIN — QUETIAPINE FUMARATE 25 MG: 25 TABLET ORAL at 10:20

## 2018-12-27 RX ADMIN — ACETAMINOPHEN 650 MG: 325 TABLET, FILM COATED ORAL at 10:18

## 2018-12-27 RX ADMIN — FOLIC ACID 1 MG: 1 TABLET ORAL at 08:35

## 2018-12-27 RX ADMIN — PAROXETINE 40 MG: 40 TABLET, FILM COATED ORAL at 08:33

## 2018-12-27 RX ADMIN — ACETAMINOPHEN 650 MG: 325 TABLET, FILM COATED ORAL at 01:12

## 2018-12-27 RX ADMIN — MULTIPLE VITAMINS W/ MINERALS TAB 1 TABLET: TAB at 08:33

## 2018-12-27 RX ADMIN — Medication 250 MG: at 08:34

## 2018-12-27 RX ADMIN — GABAPENTIN 600 MG: 300 CAPSULE ORAL at 13:53

## 2018-12-27 RX ADMIN — QUETIAPINE FUMARATE 25 MG: 25 TABLET ORAL at 01:15

## 2018-12-27 RX ADMIN — LEVOTHYROXINE SODIUM 75 MCG: 75 TABLET ORAL at 08:34

## 2018-12-27 RX ADMIN — ACETAMINOPHEN 650 MG: 325 TABLET, FILM COATED ORAL at 05:32

## 2018-12-27 RX ADMIN — QUETIAPINE FUMARATE 25 MG: 25 TABLET ORAL at 13:53

## 2018-12-27 RX ADMIN — IPRATROPIUM BROMIDE AND ALBUTEROL SULFATE 3 ML: .5; 3 SOLUTION RESPIRATORY (INHALATION) at 01:27

## 2018-12-27 RX ADMIN — PANTOPRAZOLE SODIUM 40 MG: 40 TABLET, DELAYED RELEASE ORAL at 08:34

## 2018-12-27 ASSESSMENT — ACTIVITIES OF DAILY LIVING (ADL)
HYGIENE/GROOMING: INDEPENDENT
ORAL_HYGIENE: INDEPENDENT
DRESS: INDEPENDENT

## 2018-12-27 NOTE — DISCHARGE INSTRUCTIONS
Behavioral Discharge Planning and Instructions  THANK YOU FOR CHOOSING THE [address]  [phone]    Summary: You were admitted to [facility] on 12/19/18 for detoxification from alcohol.  A medical exam was performed that included lab work. You have met with a  and opted to enter [facility] for treatment.  Please take care and make your recovery a daily priority, Kay! It was a pleasure working with you and the entire treatment team here wishes you the very best in your recovery!     [place] [address]  [phone]     Main Diagnoses:  Per Dr. Drew;  Alcohol use disorder, severe  Major depressive disorder, recurrent, without psychotic features  History of anxiety.     Major Treatments, Procedures and Findings:  You were treated for alcohol detoxification using valium administered based on the [facility] protocol. You have not had a chemical dependency assessment. You had labs drawn and those results were reviewed with you. Please take a copy of your lab work with you to your next primary care physician appointment.    Symptoms to Report:  If you experience more anxiety, confusion, sleeplessness, deep sadness or thoughts of suicide, notify your treatment team or notify your primary care physician. IF ANY OF THE SYMPTOMS YOU ARE EXPERIENCING ARE A MEDICAL EMERGENCY CALL 911 IMMEDIATELY.     Lifestyle Adjustment: Adjust your lifestyle to get enough sleep, relaxation, exercise and good nutrition. Continue to develop healthy coping skills to decrease stress and promote a sober living environment. Do not use mood altering substances including alcohol, illegal drugs or addictive medications other than what is currently prescribed.     Disposition: [facility]    Follow-up Appointment:   Appointment Date/Time: ***    Psychiatrist/Primary Care Giver: ***    Address: ***    Phone Number: ***      Resources:   AA/NA  and Sponsors are excellent resources for support and you can find one at any support group meeting.   http://www.aastpaul.org (then click meetings) This for the [facility] AA meetings  http://a[place].org/meetings/   This is for the [facility] AA meetings  [email or web address].deirkota.[email or web address] (then click find a  Meeting) This is for [facility] NA   SMART Recovery - self management for addiction recovery:  www.smartrecovery.org  Pathways ~ A Health Crisis Resource & Support Center:  359.915.5766.  http://www.harmreduction.org  [facility] [phone]  Support Group:  AA/NA and Sponsor/support.  National [place] on Mental Illness (www.[place].org): 852.265.2536 or 710-472-7167.  Alcoholics Anonymous (www.alcoholics-anonymous.org): Check your phone book for your local chapter.  Suicide Awareness Voices of Education (SAVE) (www.save.org): 341-890-DTJF (2483)  National Suicide Prevention Line (www.mentalhealthmn.org): 680-485-ZPAK (2781)  Mental Health Consumer/Survivor Network of [place] (www.mhcsn.net): 935.690.4230 or 317-727-0410  Mental Health Association of [place] (www.mentalhealth.org): 309.895.3606 or 754-293-0119   Substance Abuse and Mental Health Services (www.samhsa.gov)    [place] Recovery Connection ([facility])  [facility] connects people seeking recovery to resources that help foster and sustain long-term recovery.  Whether you are seeking resources for treatment, transportation, housing, job training, education, health care or other pathways to recovery, [facility] is a great place to start.  632.122.4207.  www.[facility].org    General Medication Instructions:   See your medication sheet(s) for instructions.   Take all medications as prescribed.  Make no changes unless your doctor suggests them.   Go to all your doctor visits.  Be sure to have all your required lab tests. This way, your medicines can be refilled on time.  Do not use any forms of alcohol.    Please Note:  If you have any questions at  anytime after you are discharged please call the [facility] detox unit 3AW unit at [phone].  [facility], Behavioral Intake [phone]  Medical Records call [phone]  Outpatient Behavioral Intake call [phone]  LP+ Wait List/Bed Availability call [phone]    Please take this discharge folder with you to all your follow up appointments, it contains your lab results, diagnosis, medication list and discharge recommendations.      THANK YOU FOR CHOOSING THE [facility]

## 2018-12-27 NOTE — PROGRESS NOTES
Patient discharged with personal belongings and discharge medications to [facility]. Discharge medication instructions and lab results reviewed. Patient verbalizes understanding. Denies thoughts of self harm.Patient escorted off the unit by Psyche Associate Alysha GODFREY

## 2018-12-27 NOTE — PROGRESS NOTES
S:  At 19:05 pt came to desk and requested a neb stating that she was having difficulty breathing.  O2 sats at that time were 94%. Her HR was 96.  Writer contacted RT.  RT stated that she was in the middle of an intubation in the ED and couldn't come.  Writer requested that they come when they were done with the procedure.  Kay went to her room and practiced some slow breathing and was able to calm down and thus she became able to breath more easily.  RT never showed up.    RT was later paged a second time (at 22:45)  When pt came to desk with another request for neb.  RT has not responded to the second request.  Writer put in report that pt could use a medical bed if she does not discharge.

## 2018-12-27 NOTE — PROGRESS NOTES
Pt requesting neb treatment. States she feels SOB. O2 sat on room air is 94 %. Will call respiratory for a neb treatment.

## 2019-09-26 ENCOUNTER — HOSPITAL ENCOUNTER (INPATIENT)
Facility: CLINIC | Age: 64
LOS: 5 days | Discharge: PSYCHIATRIC OR SUBSTANCE USE FACILITY | End: 2019-10-01
Attending: EMERGENCY MEDICINE | Admitting: HOSPITALIST
Payer: COMMERCIAL

## 2019-09-26 DIAGNOSIS — F10.20 UNCOMPLICATED ALCOHOL DEPENDENCE (H): ICD-10-CM

## 2019-09-26 DIAGNOSIS — Z86.69 HX OF MIGRAINE HEADACHES: ICD-10-CM

## 2019-09-26 DIAGNOSIS — G89.29 CHRONIC BILATERAL LOW BACK PAIN, UNSPECIFIED WHETHER SCIATICA PRESENT: ICD-10-CM

## 2019-09-26 DIAGNOSIS — F10.930 ALCOHOL WITHDRAWAL SYNDROME WITHOUT COMPLICATION (H): Primary | ICD-10-CM

## 2019-09-26 DIAGNOSIS — R25.8 CHOREIFORM MOVEMENTS: ICD-10-CM

## 2019-09-26 DIAGNOSIS — M54.50 CHRONIC BILATERAL LOW BACK PAIN, UNSPECIFIED WHETHER SCIATICA PRESENT: ICD-10-CM

## 2019-09-26 DIAGNOSIS — F06.4 ANXIETY DISORDER DUE TO KNOWN PHYSIOLOGICAL CONDITION: ICD-10-CM

## 2019-09-26 DIAGNOSIS — F10.220 ACUTE ALCOHOLIC INTOXICATION IN ALCOHOLISM WITHOUT COMPLICATION (H): ICD-10-CM

## 2019-09-26 DIAGNOSIS — Z86.718 HISTORY OF DEEP VENOUS THROMBOSIS: ICD-10-CM

## 2019-09-26 DIAGNOSIS — E03.9 HYPOTHYROIDISM, UNSPECIFIED TYPE: ICD-10-CM

## 2019-09-26 DIAGNOSIS — E61.1 IRON DEFICIENCY: ICD-10-CM

## 2019-09-26 DIAGNOSIS — M79.2 NEUROPATHIC PAIN: ICD-10-CM

## 2019-09-26 DIAGNOSIS — T78.2XXD ANAPHYLAXIS, SUBSEQUENT ENCOUNTER: ICD-10-CM

## 2019-09-26 DIAGNOSIS — F10.239 ALCOHOL DEPENDENCE WITH WITHDRAWAL WITH COMPLICATION (H): ICD-10-CM

## 2019-09-26 PROBLEM — F10.939 ALCOHOL WITHDRAWAL (H): Status: ACTIVE | Noted: 2019-09-26

## 2019-09-26 LAB
ALBUMIN SERPL-MCNC: 3.6 G/DL (ref 3.4–5)
ALCOHOL BREATH TEST: 0.12 (ref 0–0.01)
ALP SERPL-CCNC: 153 U/L (ref 40–150)
ALT SERPL W P-5'-P-CCNC: 24 U/L (ref 0–50)
AMPHETAMINES UR QL SCN: NEGATIVE
ANION GAP SERPL CALCULATED.3IONS-SCNC: 2 MMOL/L (ref 3–14)
AST SERPL W P-5'-P-CCNC: 27 U/L (ref 0–45)
BARBITURATES UR QL: NEGATIVE
BASOPHILS # BLD AUTO: 0 10E9/L (ref 0–0.2)
BASOPHILS NFR BLD AUTO: 0.8 %
BENZODIAZ UR QL: NEGATIVE
BILIRUB SERPL-MCNC: 0.1 MG/DL (ref 0.2–1.3)
BUN SERPL-MCNC: 14 MG/DL (ref 7–30)
CALCIUM SERPL-MCNC: 8.5 MG/DL (ref 8.5–10.1)
CANNABINOIDS UR QL SCN: NEGATIVE
CHLORIDE SERPL-SCNC: 113 MMOL/L (ref 94–109)
CO2 SERPL-SCNC: 28 MMOL/L (ref 20–32)
COCAINE UR QL: NEGATIVE
CREAT SERPL-MCNC: 0.78 MG/DL (ref 0.52–1.04)
DIFFERENTIAL METHOD BLD: ABNORMAL
EOSINOPHIL # BLD AUTO: 0.3 10E9/L (ref 0–0.7)
EOSINOPHIL NFR BLD AUTO: 5.1 %
ERYTHROCYTE [DISTWIDTH] IN BLOOD BY AUTOMATED COUNT: 17.2 % (ref 10–15)
ETHANOL SERPL-MCNC: 0.17 G/DL
GFR SERPL CREATININE-BSD FRML MDRD: 81 ML/MIN/{1.73_M2}
GLUCOSE SERPL-MCNC: 108 MG/DL (ref 70–99)
HCT VFR BLD AUTO: 37.2 % (ref 35–47)
HGB BLD-MCNC: 11.5 G/DL (ref 11.7–15.7)
IMM GRANULOCYTES # BLD: 0 10E9/L (ref 0–0.4)
IMM GRANULOCYTES NFR BLD: 0.2 %
LYMPHOCYTES # BLD AUTO: 1.8 10E9/L (ref 0.8–5.3)
LYMPHOCYTES NFR BLD AUTO: 34.4 %
MAGNESIUM SERPL-MCNC: 1.8 MG/DL (ref 1.6–2.3)
MCH RBC QN AUTO: 26 PG (ref 26.5–33)
MCHC RBC AUTO-ENTMCNC: 30.9 G/DL (ref 31.5–36.5)
MCV RBC AUTO: 84 FL (ref 78–100)
MONOCYTES # BLD AUTO: 0.4 10E9/L (ref 0–1.3)
MONOCYTES NFR BLD AUTO: 7.8 %
NEUTROPHILS # BLD AUTO: 2.7 10E9/L (ref 1.6–8.3)
NEUTROPHILS NFR BLD AUTO: 51.7 %
NRBC # BLD AUTO: 0 10*3/UL
NRBC BLD AUTO-RTO: 0 /100
OPIATES UR QL SCN: NEGATIVE
PCP UR QL SCN: NEGATIVE
PLATELET # BLD AUTO: 289 10E9/L (ref 150–450)
POTASSIUM SERPL-SCNC: 3.9 MMOL/L (ref 3.4–5.3)
PROT SERPL-MCNC: 7.2 G/DL (ref 6.8–8.8)
RBC # BLD AUTO: 4.42 10E12/L (ref 3.8–5.2)
SODIUM SERPL-SCNC: 143 MMOL/L (ref 133–144)
T4 FREE SERPL-MCNC: 0.64 NG/DL (ref 0.76–1.46)
TSH SERPL DL<=0.005 MIU/L-ACNC: 4.34 MU/L (ref 0.4–4)
VIT B12 SERPL-MCNC: 274 PG/ML (ref 193–986)
WBC # BLD AUTO: 5.3 10E9/L (ref 4–11)

## 2019-09-26 PROCEDURE — 25000128 H RX IP 250 OP 636

## 2019-09-26 PROCEDURE — 83735 ASSAY OF MAGNESIUM: CPT | Performed by: HOSPITALIST

## 2019-09-26 PROCEDURE — 25000128 H RX IP 250 OP 636: Performed by: EMERGENCY MEDICINE

## 2019-09-26 PROCEDURE — 84443 ASSAY THYROID STIM HORMONE: CPT | Performed by: EMERGENCY MEDICINE

## 2019-09-26 PROCEDURE — 83735 ASSAY OF MAGNESIUM: CPT | Performed by: EMERGENCY MEDICINE

## 2019-09-26 PROCEDURE — 25000132 ZZH RX MED GY IP 250 OP 250 PS 637: Performed by: HOSPITALIST

## 2019-09-26 PROCEDURE — 82607 VITAMIN B-12: CPT | Performed by: EMERGENCY MEDICINE

## 2019-09-26 PROCEDURE — 12000000 ZZH R&B MED SURG/OB

## 2019-09-26 PROCEDURE — 25800030 ZZH RX IP 258 OP 636: Performed by: HOSPITALIST

## 2019-09-26 PROCEDURE — HZ2ZZZZ DETOXIFICATION SERVICES FOR SUBSTANCE ABUSE TREATMENT: ICD-10-PCS | Performed by: HOSPITALIST

## 2019-09-26 PROCEDURE — 99285 EMERGENCY DEPT VISIT HI MDM: CPT | Mod: 25

## 2019-09-26 PROCEDURE — 80053 COMPREHEN METABOLIC PANEL: CPT | Performed by: EMERGENCY MEDICINE

## 2019-09-26 PROCEDURE — 96374 THER/PROPH/DIAG INJ IV PUSH: CPT

## 2019-09-26 PROCEDURE — 99223 1ST HOSP IP/OBS HIGH 75: CPT | Mod: AI | Performed by: HOSPITALIST

## 2019-09-26 PROCEDURE — 96376 TX/PRO/DX INJ SAME DRUG ADON: CPT

## 2019-09-26 PROCEDURE — 84439 ASSAY OF FREE THYROXINE: CPT | Performed by: EMERGENCY MEDICINE

## 2019-09-26 PROCEDURE — 25000132 ZZH RX MED GY IP 250 OP 250 PS 637: Performed by: EMERGENCY MEDICINE

## 2019-09-26 PROCEDURE — 80307 DRUG TEST PRSMV CHEM ANLYZR: CPT | Performed by: EMERGENCY MEDICINE

## 2019-09-26 PROCEDURE — 83970 ASSAY OF PARATHORMONE: CPT | Performed by: EMERGENCY MEDICINE

## 2019-09-26 PROCEDURE — 85025 COMPLETE CBC W/AUTO DIFF WBC: CPT | Performed by: EMERGENCY MEDICINE

## 2019-09-26 PROCEDURE — 80320 DRUG SCREEN QUANTALCOHOLS: CPT | Performed by: EMERGENCY MEDICINE

## 2019-09-26 RX ORDER — FOLIC ACID 1 MG/1
1 TABLET ORAL ONCE
Status: COMPLETED | OUTPATIENT
Start: 2019-09-26 | End: 2019-09-26

## 2019-09-26 RX ORDER — LIDOCAINE 40 MG/G
CREAM TOPICAL
Status: DISCONTINUED | OUTPATIENT
Start: 2019-09-26 | End: 2019-10-01 | Stop reason: HOSPADM

## 2019-09-26 RX ORDER — ALBUTEROL SULFATE 90 UG/1
1-2 AEROSOL, METERED RESPIRATORY (INHALATION) EVERY 4 HOURS PRN
Status: ON HOLD | COMMUNITY
End: 2019-10-01

## 2019-09-26 RX ORDER — HYDROXYZINE HYDROCHLORIDE 25 MG/1
25-50 TABLET, FILM COATED ORAL 3 TIMES DAILY PRN
Status: ON HOLD | COMMUNITY
End: 2019-10-01

## 2019-09-26 RX ORDER — AMOXICILLIN 250 MG
2 CAPSULE ORAL 2 TIMES DAILY PRN
Status: DISCONTINUED | OUTPATIENT
Start: 2019-09-26 | End: 2019-10-01 | Stop reason: HOSPADM

## 2019-09-26 RX ORDER — LANOLIN ALCOHOL/MO/W.PET/CERES
100 CREAM (GRAM) TOPICAL DAILY
Status: COMPLETED | OUTPATIENT
Start: 2019-09-26 | End: 2019-09-30

## 2019-09-26 RX ORDER — EPINEPHRINE 0.3 MG/.3ML
0.3 INJECTION SUBCUTANEOUS PRN
Status: ON HOLD | COMMUNITY
End: 2019-10-01

## 2019-09-26 RX ORDER — QUETIAPINE FUMARATE 200 MG/1
200 TABLET, FILM COATED ORAL AT BEDTIME
Status: ON HOLD | COMMUNITY
End: 2019-10-01

## 2019-09-26 RX ORDER — ONDANSETRON 2 MG/ML
INJECTION INTRAMUSCULAR; INTRAVENOUS
Status: COMPLETED
Start: 2019-09-26 | End: 2019-09-26

## 2019-09-26 RX ORDER — GABAPENTIN 300 MG/1
900 CAPSULE ORAL 3 TIMES DAILY
Status: ON HOLD | COMMUNITY
End: 2019-10-01

## 2019-09-26 RX ORDER — LORAZEPAM 2 MG/ML
1 INJECTION INTRAMUSCULAR ONCE
Status: COMPLETED | OUTPATIENT
Start: 2019-09-26 | End: 2019-09-26

## 2019-09-26 RX ORDER — PROCHLORPERAZINE 25 MG
25 SUPPOSITORY, RECTAL RECTAL EVERY 12 HOURS PRN
Status: DISCONTINUED | OUTPATIENT
Start: 2019-09-26 | End: 2019-10-01 | Stop reason: HOSPADM

## 2019-09-26 RX ORDER — DIAZEPAM 10 MG/2ML
5-10 INJECTION, SOLUTION INTRAMUSCULAR; INTRAVENOUS EVERY 30 MIN PRN
Status: DISCONTINUED | OUTPATIENT
Start: 2019-09-26 | End: 2019-10-01 | Stop reason: HOSPADM

## 2019-09-26 RX ORDER — LANOLIN ALCOHOL/MO/W.PET/CERES
100 CREAM (GRAM) TOPICAL ONCE
Status: COMPLETED | OUTPATIENT
Start: 2019-09-26 | End: 2019-09-26

## 2019-09-26 RX ORDER — LEVOTHYROXINE SODIUM 75 UG/1
75 TABLET ORAL
Status: DISCONTINUED | OUTPATIENT
Start: 2019-09-27 | End: 2019-10-01 | Stop reason: HOSPADM

## 2019-09-26 RX ORDER — ACETAMINOPHEN 325 MG/1
650 TABLET ORAL EVERY 4 HOURS PRN
Status: DISCONTINUED | OUTPATIENT
Start: 2019-09-26 | End: 2019-10-01 | Stop reason: HOSPADM

## 2019-09-26 RX ORDER — BISACODYL 10 MG
10 SUPPOSITORY, RECTAL RECTAL DAILY PRN
Status: DISCONTINUED | OUTPATIENT
Start: 2019-09-26 | End: 2019-10-01 | Stop reason: HOSPADM

## 2019-09-26 RX ORDER — FOLIC ACID 1 MG/1
1 TABLET ORAL DAILY
Status: DISCONTINUED | OUTPATIENT
Start: 2019-09-26 | End: 2019-10-01 | Stop reason: HOSPADM

## 2019-09-26 RX ORDER — DIAZEPAM 5 MG
10 TABLET ORAL EVERY 30 MIN PRN
Status: DISCONTINUED | OUTPATIENT
Start: 2019-09-26 | End: 2019-10-01 | Stop reason: HOSPADM

## 2019-09-26 RX ORDER — POLYETHYLENE GLYCOL 3350 17 G/17G
17 POWDER, FOR SOLUTION ORAL DAILY PRN
Status: DISCONTINUED | OUTPATIENT
Start: 2019-09-26 | End: 2019-10-01 | Stop reason: HOSPADM

## 2019-09-26 RX ORDER — METHYLPHENIDATE HYDROCHLORIDE 54 MG/1
54 TABLET ORAL EVERY MORNING
Status: DISCONTINUED | OUTPATIENT
Start: 2019-09-27 | End: 2019-09-27

## 2019-09-26 RX ORDER — AMOXICILLIN 250 MG
1 CAPSULE ORAL 2 TIMES DAILY PRN
Status: DISCONTINUED | OUTPATIENT
Start: 2019-09-26 | End: 2019-10-01 | Stop reason: HOSPADM

## 2019-09-26 RX ORDER — POTASSIUM CHLORIDE 7.45 MG/ML
10 INJECTION INTRAVENOUS
Status: DISCONTINUED | OUTPATIENT
Start: 2019-09-26 | End: 2019-09-27

## 2019-09-26 RX ORDER — LIDOCAINE 4 G/G
1 PATCH TOPICAL DAILY PRN
Status: ON HOLD | COMMUNITY
End: 2019-10-01

## 2019-09-26 RX ORDER — MULTIVITAMIN,THERAPEUTIC
1 TABLET ORAL ONCE
Status: COMPLETED | OUTPATIENT
Start: 2019-09-26 | End: 2019-09-26

## 2019-09-26 RX ORDER — NALOXONE HYDROCHLORIDE 0.4 MG/ML
.1-.4 INJECTION, SOLUTION INTRAMUSCULAR; INTRAVENOUS; SUBCUTANEOUS
Status: DISCONTINUED | OUTPATIENT
Start: 2019-09-26 | End: 2019-10-01 | Stop reason: HOSPADM

## 2019-09-26 RX ORDER — LIDOCAINE 4 G/G
1 PATCH TOPICAL
Status: DISCONTINUED | OUTPATIENT
Start: 2019-09-27 | End: 2019-10-01 | Stop reason: HOSPADM

## 2019-09-26 RX ORDER — PROCHLORPERAZINE MALEATE 5 MG
10 TABLET ORAL EVERY 6 HOURS PRN
Status: DISCONTINUED | OUTPATIENT
Start: 2019-09-26 | End: 2019-10-01 | Stop reason: HOSPADM

## 2019-09-26 RX ORDER — SODIUM CHLORIDE 9 MG/ML
INJECTION, SOLUTION INTRAVENOUS CONTINUOUS
Status: DISCONTINUED | OUTPATIENT
Start: 2019-09-26 | End: 2019-09-27 | Stop reason: CLARIF

## 2019-09-26 RX ORDER — ONDANSETRON 2 MG/ML
4 INJECTION INTRAMUSCULAR; INTRAVENOUS EVERY 6 HOURS PRN
Status: DISCONTINUED | OUTPATIENT
Start: 2019-09-26 | End: 2019-10-01 | Stop reason: HOSPADM

## 2019-09-26 RX ORDER — MAGNESIUM OXIDE 400 MG/1
800 TABLET ORAL ONCE
Status: COMPLETED | OUTPATIENT
Start: 2019-09-26 | End: 2019-09-26

## 2019-09-26 RX ORDER — FERROUS SULFATE 325(65) MG
325 TABLET ORAL EVERY OTHER DAY
Status: ON HOLD | COMMUNITY
End: 2019-10-01

## 2019-09-26 RX ORDER — POTASSIUM CL/LIDO/0.9 % NACL 10MEQ/0.1L
10 INTRAVENOUS SOLUTION, PIGGYBACK (ML) INTRAVENOUS
Status: DISCONTINUED | OUTPATIENT
Start: 2019-09-26 | End: 2019-09-27

## 2019-09-26 RX ORDER — HYDROXYZINE HYDROCHLORIDE 25 MG/1
25-50 TABLET, FILM COATED ORAL 3 TIMES DAILY PRN
Status: DISCONTINUED | OUTPATIENT
Start: 2019-09-26 | End: 2019-10-01 | Stop reason: HOSPADM

## 2019-09-26 RX ORDER — METHYLPHENIDATE HYDROCHLORIDE 54 MG/1
54 TABLET ORAL EVERY MORNING
Status: ON HOLD | COMMUNITY
End: 2019-10-01

## 2019-09-26 RX ORDER — PAROXETINE 40 MG/1
40 TABLET, FILM COATED ORAL EVERY MORNING
Status: DISCONTINUED | OUTPATIENT
Start: 2019-09-27 | End: 2019-09-27

## 2019-09-26 RX ORDER — POTASSIUM CHLORIDE 1500 MG/1
20-40 TABLET, EXTENDED RELEASE ORAL
Status: DISCONTINUED | OUTPATIENT
Start: 2019-09-26 | End: 2019-09-27

## 2019-09-26 RX ORDER — POTASSIUM CHLORIDE 1.5 G/1.58G
20-40 POWDER, FOR SOLUTION ORAL
Status: DISCONTINUED | OUTPATIENT
Start: 2019-09-26 | End: 2019-09-27

## 2019-09-26 RX ORDER — ONDANSETRON 4 MG/1
4 TABLET, ORALLY DISINTEGRATING ORAL EVERY 6 HOURS PRN
Status: DISCONTINUED | OUTPATIENT
Start: 2019-09-26 | End: 2019-10-01 | Stop reason: HOSPADM

## 2019-09-26 RX ORDER — MAGNESIUM SULFATE HEPTAHYDRATE 40 MG/ML
4 INJECTION, SOLUTION INTRAVENOUS EVERY 4 HOURS PRN
Status: DISCONTINUED | OUTPATIENT
Start: 2019-09-26 | End: 2019-10-01 | Stop reason: HOSPADM

## 2019-09-26 RX ORDER — ACETAMINOPHEN 650 MG/1
650 SUPPOSITORY RECTAL EVERY 4 HOURS PRN
Status: DISCONTINUED | OUTPATIENT
Start: 2019-09-26 | End: 2019-10-01 | Stop reason: HOSPADM

## 2019-09-26 RX ORDER — MULTIPLE VITAMINS W/ MINERALS TAB 9MG-400MCG
1 TAB ORAL DAILY
Status: DISCONTINUED | OUTPATIENT
Start: 2019-09-26 | End: 2019-10-01 | Stop reason: HOSPADM

## 2019-09-26 RX ORDER — POTASSIUM CHLORIDE 29.8 MG/ML
20 INJECTION INTRAVENOUS
Status: DISCONTINUED | OUTPATIENT
Start: 2019-09-26 | End: 2019-09-26 | Stop reason: CLARIF

## 2019-09-26 RX ORDER — GABAPENTIN 300 MG/1
900 CAPSULE ORAL 3 TIMES DAILY
Status: DISCONTINUED | OUTPATIENT
Start: 2019-09-26 | End: 2019-10-01 | Stop reason: HOSPADM

## 2019-09-26 RX ADMIN — Medication 100 MG: at 14:59

## 2019-09-26 RX ADMIN — FOLIC ACID 1 MG: 1 TABLET ORAL at 15:00

## 2019-09-26 RX ADMIN — THERA TABS 1 TABLET: TAB at 14:59

## 2019-09-26 RX ADMIN — RIVAROXABAN 20 MG: 20 TABLET, FILM COATED ORAL at 22:58

## 2019-09-26 RX ADMIN — LORAZEPAM 1 MG: 2 INJECTION INTRAMUSCULAR; INTRAVENOUS at 14:59

## 2019-09-26 RX ADMIN — LORAZEPAM 1 MG: 2 INJECTION INTRAMUSCULAR; INTRAVENOUS at 16:09

## 2019-09-26 RX ADMIN — MULTIPLE VITAMINS W/ MINERALS TAB 1 TABLET: TAB at 19:18

## 2019-09-26 RX ADMIN — FOLIC ACID 1 MG: 1 TABLET ORAL at 19:18

## 2019-09-26 RX ADMIN — Medication 100 MG: at 19:18

## 2019-09-26 RX ADMIN — SODIUM CHLORIDE: 9 INJECTION, SOLUTION INTRAVENOUS at 18:41

## 2019-09-26 RX ADMIN — Medication 800 MG: at 15:04

## 2019-09-26 RX ADMIN — ONDANSETRON 4 MG: 2 INJECTION INTRAMUSCULAR; INTRAVENOUS at 14:59

## 2019-09-26 RX ADMIN — GABAPENTIN 900 MG: 300 CAPSULE ORAL at 22:17

## 2019-09-26 ASSESSMENT — ENCOUNTER SYMPTOMS
WOUND: 1
NERVOUS/ANXIOUS: 1

## 2019-09-26 ASSESSMENT — MIFFLIN-ST. JEOR: SCORE: 1446.06

## 2019-09-26 ASSESSMENT — ACTIVITIES OF DAILY LIVING (ADL): ADLS_ACUITY_SCORE: 11

## 2019-09-26 NOTE — ED NOTES
"[facility]  ED Nurse Handoff Report    ED Chief complaint: Alcohol Intoxication      ED Diagnosis:   Final diagnoses:   Acute alcoholic intoxication in alcoholism without complication (H)   Alcohol withdrawal syndrome without complication (H)   Choreiform movements       Code Status: Full Code    Allergies:   Allergies   Allergen Reactions     Contrast Dye Swelling     Pistachios [Nuts] Swelling     Facial swelling       Activity level - Baseline/Home:  Independent  Activity Level - Current:   Independent    Patient's Preferred language: English   Needed?: No    Isolation: Yes  Infection: Not Applicable    Bariatric?: No    Vital Signs:   Vitals:    09/26/19 1352 09/26/19 1353   BP: 133/87 133/87   Pulse: 101    Resp: 28 16   Temp: 98  F (36.7  C) 98  F (36.7  C)   TempSrc: Oral Oral   SpO2: 97% 97%   Weight:  86.6 kg (191 lb)   Height:  1.689 m (5' 6.5\")       Cardiac Rhythm: ,        Pain level: 0-10 Pain Scale: 5    Is this patient confused?: No   Does this patient have a guardian?  No         If yes, is there guardianship documents in the Epic \"Code/ACP\" activity?  N/A         Guardian Notified?  N/A  [place] - Suicide Severity Rating Scale Completed?  Yes  If yes, what color did the patient score?  White    Patient Report: Initial Complaint: Pt. Had 4 shots of alcohol this morning and wishes to stop and go  Back to inpatient treatment.  Focused Assessment: Alert, oriented and speech is slightly mumbled as intoxicated. Pt. Has involuntary movements and is restless, up walking around due to anxiousness. Pt. Denies dyspnea or chest pain. Can make needs known. Strength equal bilat in extremities, no neuro deficits.  Tests Performed: labs  Abnormal Results:   Results for orders placed or performed during the hospital encounter of 09/26/19   CBC with platelets differential   Result Value Ref Range    WBC 5.3 4.0 - 11.0 10e9/L    RBC Count 4.42 3.8 - 5.2 10e12/L    Hemoglobin 11.5 (L) 11.7 " - 15.7 g/dL    Hematocrit 37.2 35.0 - 47.0 %    MCV 84 78 - 100 fl    MCH 26.0 (L) 26.5 - 33.0 pg    MCHC 30.9 (L) 31.5 - 36.5 g/dL    RDW 17.2 (H) 10.0 - 15.0 %    Platelet Count 289 150 - 450 10e9/L    Diff Method Automated Method     % Neutrophils 51.7 %    % Lymphocytes 34.4 %    % Monocytes 7.8 %    % Eosinophils 5.1 %    % Basophils 0.8 %    % Immature Granulocytes 0.2 %    Nucleated RBCs 0 0 /100    Absolute Neutrophil 2.7 1.6 - 8.3 10e9/L    Absolute Lymphocytes 1.8 0.8 - 5.3 10e9/L    Absolute Monocytes 0.4 0.0 - 1.3 10e9/L    Absolute Eosinophils 0.3 0.0 - 0.7 10e9/L    Absolute Basophils 0.0 0.0 - 0.2 10e9/L    Abs Immature Granulocytes 0.0 0 - 0.4 10e9/L    Absolute Nucleated RBC 0.0    Comprehensive metabolic panel   Result Value Ref Range    Sodium 143 133 - 144 mmol/L    Potassium 3.9 3.4 - 5.3 mmol/L    Chloride 113 (H) 94 - 109 mmol/L    Carbon Dioxide 28 20 - 32 mmol/L    Anion Gap 2 (L) 3 - 14 mmol/L    Glucose 108 (H) 70 - 99 mg/dL    Urea Nitrogen 14 7 - 30 mg/dL    Creatinine 0.78 0.52 - 1.04 mg/dL    GFR Estimate 81 >60 mL/min/[1.73_m2]    GFR Estimate If Black >90 >60 mL/min/[1.73_m2]    Calcium 8.5 8.5 - 10.1 mg/dL    Bilirubin Total 0.1 (L) 0.2 - 1.3 mg/dL    Albumin 3.6 3.4 - 5.0 g/dL    Protein Total 7.2 6.8 - 8.8 g/dL    Alkaline Phosphatase 153 (H) 40 - 150 U/L    ALT 24 0 - 50 U/L    AST 27 0 - 45 U/L   Alcohol level blood   Result Value Ref Range    Ethanol g/dL 0.17 (H) <0.01 g/dL   Drug abuse screen urine   Result Value Ref Range    Amphetamine Qual Urine Negative NEG^Negative    Barbiturates Qual Urine Negative NEG^Negative    Benzodiazepine Qual Urine Negative NEG^Negative    Cannabinoids Qual Urine Negative NEG^Negative    Cocaine Qual Urine Negative NEG^Negative    Opiates Qualitative Urine Negative NEG^Negative    PCP Qual Urine Negative NEG^Negative   Alcohol breath test POCT   Result Value Ref Range    Alcohol Breath Test 0.116 (A) 0.00 - 0.01     Treatments provided: Ativan  to help calm pt  Family Comments: None at this time    OBS brochure/video discussed/provided to patient/family: No              Name of person given brochure if not patient: NA              Relationship to patient: NA    ED Medications:   Medications   LORazepam (ATIVAN) injection 1 mg (1 mg Intravenous Given 9/26/19 1459)   multivitamin, therapeutic (THERA-VIT) tablet 1 tablet (1 tablet Oral Given 9/26/19 1459)   folic acid (FOLVITE) tablet 1 mg (1 mg Oral Given 9/26/19 1500)   vitamin B1 (THIAMINE) tablet 100 mg (100 mg Oral Given 9/26/19 1459)   magnesium oxide (MAG-OX) tablet 800 mg (800 mg Oral Given 9/26/19 1504)   ondansetron (ZOFRAN) 2 MG/ML injection (4 mg  Given 9/26/19 1459)   LORazepam (ATIVAN) injection 1 mg (1 mg Intravenous Given 9/26/19 1609)       Drips infusing?:  No    For the majority of the shift this patient was Green.   Interventions performed were updated on plan of care, food, bathroom.    Severe Sepsis OR Septic Shock Diagnosis Present: No    To be done/followed up on inpatient unit:  Continue monitoring    ED NURSE PHONE NUMBER: [phone]

## 2019-09-26 NOTE — ED PROVIDER NOTES
History     Chief Complaint:  Alcohol intoxication     HPI   Kay Dunham is a 64 year old female who is on Xarelto and presents with alcohol intoxication. The patient was recently at [facility] for treatment of her alcoholism in January. She has been at home as of late and drinking over the summer. Today, the patient's daughter has taken her to the ED for detox and treatment. Here, the patient states she last had 4 shots of whiskey this morning. She states she is anxious but not suicidal. She would like to go back to [facility] for treatment. She adds that she has some bruises on her legs as she is on Xarelto and has been pushing her daughter and  around. She denies them hitting her. The patient acknowledges some anxious movement and denies ever seeing a neurologist before.     Allergies:  contrast dye      Medications:    gabapentin (NEURONTIN) 300 MG capsule  levothyroxine (SYNTHROID/LEVOTHROID) 75 MCG tablet  PARoxetine (PAXIL) 40 MG tablet  propranolol (INDERAL) 10 MG tablet  QUEtiapine (SEROQUEL) 25 MG tablet  QUEtiapine (SEROQUEL) 50 MG tablet  ZOLMitriptan (ZOMIG-ZMT) 5 MG ODT      Past Medical History:    Alcohol abuse and dependence   Thyroid disease   Anemia   Hiatal hernia   Pyelonephritis   Low back pain   Acute pulmonary embolism   Anxiety   Right knee degenerative joint disease   Cervical radiculopathy   Attention deficit hyperactivity disorder   Generalized anxiety   Diverticulosis   Hypothyroidism   Walters's neuroma   Bipolar 1 disorder   Migraine   Rectocele   Past methamphetamine  Use   TMJ dysfunction     Past Surgical History:    Back surgery   Orthopedic surgery  Incision hernia   Hysterectomy   Fracture treatment   Cervical fusion   Ovary removal     Family History:    Cancer  Alcohol/drug abuse   Hypertension   Attention deficit hyperactivity disorder   Depression     Social History:  Marital Status:     Smoker:   Never  Alcohol:   Yes   Drugs:   No   "    Review of Systems   Skin: Positive for wound.   Psychiatric/Behavioral: Negative for suicidal ideas. The patient is nervous/anxious.    All other systems reviewed and are negative.    Physical Exam     Patient Vitals for the past 24 hrs:   BP Temp Temp src Pulse Heart Rate Resp SpO2 Height Weight   09/26/19 1353 133/87 98  F (36.7  C) Oral -- 103 16 97 % 1.689 m (5' 6.5\") 86.6 kg (191 lb)   09/26/19 1352 133/87 98  F (36.7  C) Oral 101 101 28 97 % -- --     Physical Exam  Nursing note and vitals reviewed.    Constitutional:  A lot of involuntary movement and restlessness.  She is awake and alert.   HENT:    Nose normal.  No discharge.      Oropharynx is clear and moist.  Eyes:    Conjunctivae are normal without injection. No lid droop.     Pupils are equal, round, and reactive to light.   Lymph:  No enlarged or tender cervical or submandibular lymph nodes.   Cardiovascular:  Normal rate, regular rhythm with normal S1 and S2.      Normal heart sounds and peripheral pulses 2+ and equal.       No murmur or amanuel.  Pulmonary:  Effort normal and breath sounds clear to auscultation bilaterally   No respiratory distress.  No stridor.     No wheezes. No rales.     GI:    Soft. No distension and no mass. No tenderness.      No rebound and no guarding. No flank pain.  No HSM.  Musculoskeletal:  Normal range of motion. No extremity deformity.     No edema and no tenderness.    Neurological:   Alert and oriented. No cranial nerve deficit, no facial droop.     Exhibits good muscle tone.      GCS eye subscore is 4. GCS verbal subscore is 5.      GCS motor subscore is 6. Tardive dyskinesia or chorea like type of movements of arms and legs, hands, and mouth movements  Skin:    Skin is warm and dry. No rash noted. No diaphoresis.      No erythema. No pallor. Bruising on legs, different ages.   Psychiatric:   Patient somewhat animated and agitated.  Appropriate mood and affect.     Judgment and thought content normal. "     Emergency Department Course   Laboratory:  Alcohol breath test POCT: 0.116  Alcohol level blood: 0.17  CMP: Glucose 108, chloride 113, anion gap 2, bilirubin 0.1, alkaline phosphate 153, o/w WNL (Creatinine: 0.78)  CBC: WBC: 5.3, HGB: 11.5, PLT: 289  Drug abuse screen: negative     Interventions:  1459: Zofran 4 mg IV  1459: thiamine 100 mg PO  1459: multivitamin Thera-Vit 1 tablet PO  1459: Ativan 1 mg IV  1500: Folvite 1 tablet PO  1504: magnesium oxide 800 mg PO    Emergency Department Course:  1414 I performed an exam of the patient as documented above.   1545 I rechecked the patient and discussed the results of their workup thus far.   1607 I consulted with Dr. Giang of the hospitalist services. They are in agreement to accept the patient for admission.    Findings and plan explained to the Patient who consents to admission. Discussed the patient with Dr. Giang, who will admit the patient to a Suds bed for further monitoring, evaluation, and treatment.    Impression & Plan    Medical Decision Making:  Patient comes in with alcohol intoxication wanting to get into treatment.  She has these unusual movements that look almost tardive dyskinesia-like or chorea-like.  She is on Seroquel but states that she had these before Seroquel was started last December but they were worse after she was on the Seroquel.  An IV was placed, labs are obtained and her comprehensive metabolic panel comes back normal other than an elevated alk phos of 153.  Glucose 108 and CBC normal.  Blood alcohol is 0.17, urine drug screen is negative.  She was mildly tachycardic so I gave her 1 mg of Ativan IV and repeated this about 45 minutes later because she continued to show some signs of withdrawal.  This movement disorder that she has needs neurologic consultation.  I do not know if this is related to her antipsychotic or if she has some other underlying chronic neuromuscular disorder.  She is willing to go to detox but there are no  detox beds.  I will admit her to our sixth floor and Dr. Giang will be taking care of her.  Neuro consultation.    Diagnosis:    ICD-10-CM    1. Acute alcoholic intoxication in alcoholism without complication (H) F10.220    2. Alcohol withdrawal syndrome without complication (H) F10.230    3. Choreiform movements R25.8      Disposition:  Admitted to Suds bed.  Neuro consultation.  Alcohol withdrawal precautions and Ativan as needed.  CD eval and consider helping her get into treatment when she is discharged.    Scribe Disclosure:  I, Jan Rinaldi, am serving as a scribe on 9/26/2019 at 2:14 PM to personally document services performed by Bryanna Paige MD based on my observations and the provider's statements to me.     Jan Rinaldi  9/26/2019    EMERGENCY DEPARTMENT       Bryanna Paige MD  09/26/19 [id number]

## 2019-09-26 NOTE — ED NOTES
Pt last drink at 830am 4 shots whiskey. Pt sts she is here because her anxiety is really bad and she wants to go back to treatment at [facility]. Her insurance told her to come here.

## 2019-09-26 NOTE — PROGRESS NOTES
RECEIVING UNIT ED HANDOFF REVIEW    ED Nurse Handoff Report was reviewed by: Lauren M. Meese on September 26, 2019 at 5:40 PM

## 2019-09-26 NOTE — PHARMACY-ADMISSION MEDICATION HISTORY
Admission medication history interview status for the 9/26/2019  admission is complete. See EPIC admission navigator for prior to admission medications     Medication history source reliability:Moderate    Actions taken by pharmacist (provider contacted, etc):added entire list from care everywhere and pt interview. Also called [facility] (per pt) for last fill dates     Additional medication history information not noted on PTA med list :Has not recently filled these from [facility], but reports to taking: levothyroxine, Rivaroxaban, Paroxetine.  Filled Quetiapine 25-50mg bid prn on Sep 4th, but reports to not taking    Medication reconciliation/reorder completed by provider prior to medication history? No    Time spent in this activity: 45 minutes    Prior to Admission medications    Medication Sig Last Dose Taking? Auth Provider   albuterol (PROAIR HFA/PROVENTIL HFA/VENTOLIN HFA) 108 (90 Base) MCG/ACT inhaler Inhale 1-2 puffs into the lungs every 4 hours as needed for shortness of breath / dyspnea or wheezing prn Yes Unknown, Entered By History   EPINEPHrine (EPIPEN/ADRENACLICK/OR ANY BX GENERIC EQUIV) 0.3 MG/0.3ML injection 2-pack Inject 0.3 mg into the muscle as needed for anaphylaxis  Yes Unknown, Entered By History   ferrous sulfate (FEROSUL) 325 (65 Fe) MG tablet Take 325 mg by mouth every other day Past Week at Unknown time Yes Unknown, Entered By History   gabapentin (NEURONTIN) 300 MG capsule Take 900 mg by mouth 3 times daily 9/26/2019 at x2 Yes Unknown, Entered By History   hydrOXYzine (ATARAX) 25 MG tablet Take 25-50 mg by mouth 3 times daily as needed for anxiety  Yes Unknown, Entered By History   levothyroxine (SYNTHROID/LEVOTHROID) 75 MCG tablet Take 75 mcg by mouth daily 9/25/2019 at Unknown time Yes Unknown, Entered By History   Lidocaine (LIDOCARE) 4 % Patch Place 1 patch onto the skin daily as needed for moderate pain To prevent lidocaine toxicity, patient should be patch free for 12 hrs daily. prn  Yes Unknown, Entered By History   methylphenidate (CONCERTA) 54 MG CR tablet Take 54 mg by mouth every morning 9/25/2019 at Unknown time Yes Unknown, Entered By History   PARoxetine (PAXIL) 40 MG tablet Take 1 tablet (40 mg) by mouth every morning 9/26/2019 at Unknown time Yes Luis Armando Drew MD   QUEtiapine (SEROQUEL) 200 MG tablet Take 200 mg by mouth At Bedtime 9/25/2019 at Unknown time Yes Unknown, Entered By History   rivaroxaban ANTICOAGULANT (XARELTO) 20 MG TABS tablet Take 20 mg by mouth daily (with dinner) 9/24/2019 Yes Unknown, Entered By History   ZOLMitriptan (ZOMIG-ZMT) 5 MG ODT Take 5 mg by mouth daily as needed for migraine (at onset of head ache)  prn Yes Unknown, Entered By History

## 2019-09-26 NOTE — H&P
[facility]    History and Physical - Hospitalist Service       Date of Admission:  9/26/2019    Assessment & Plan   Kay Dunham is a 64 year old female admitted on 9/26/2019.  Past history of alcohol abuse, depression, hypothyroidism, chronic back pain who presents with alcohol withdrawal, seeking help to regain sobriety.    Alcohol abuse with acute alcohol withdrawal  Drinking 1.5 pints whiskey daily, last drink morning of 9/26 with BAL of 0.17 at admission.  Presents with mild tachycardia and tremor, reporting symptoms of withdrawal.  Wishes to re-enter treatment program.  - Psych and CD consult  - CIWA protocol  - oral thiamine, folate, MVI  - electrolyte protocol    Possible chorea  Family reported to ED physician several months of involuntary movements.  Quite fidgety upon my assessment, but was able to hold still for approximately 1 minute during exam, so unclear if movements are simply related to withdrawal/anxiety.  - neuro consult  - hold PTA Seroquel for now  - check B12, TSH, PTH, magnesium    Elevated alkaline phos  Alk phos 153 at admission, bili/AST/ALT wnl.    - repeat in AM    Depression  - hold seroquel as above  - continue PTA atarax, paroxetine  - Psych consult    ADD  - continue PTA methylphenidate    Hx DVT  - continue PTA Xarelto    Chronic back pain  - continue PTA gabapentin, lidocaine patch    Hypothyroid  - continue PTA levothyroxine         Diet: Regular  DVT Prophylaxis: Xarelto  Rosen Catheter: not present  Code Status: Full code per patient    Disposition Plan   Expected discharge: 2 - 3 days, recommended to prior living arrangement vs treatment program once alcohol withdrawal resolved.  Entered: Eddie Giang MD 09/26/2019, 4:47 PM     The patient's care was discussed with the Patient.    Eddie Giang MD  [facility]    ______________________________________________________________________    Chief Complaint   Alcohol abuse and withdrawal    History  is obtained from the patient, chart review and discussion with ED provider    History of Present Illness   Kay Dunham is a 64 year old female who presents with alcohol abuse and acute alcohol withdrawal, seeking treatment.  She reports several periods of sobriety in the past, most recently the beginning of the year through May after she went to a treatment program.  In May she started drinking approximately 1.5 pints of whiskey daily, reporting her last drink was this morning.  She is currently presenting seeking treatment to help her with sobriety.  She denies any history of seizure due to alcohol withdrawal.  She reports poor oral intake in setting of alcohol abuse, ongoing lightheadedness, anxiety, racing palpitations and diaphoresis.  She reports she is generally feeling unwell.  Family reported to emergency room provider that they have noted involuntary movements.  The patient herself does not note these and cannot state when they started.  She believes they have started within the past year, but again, she has not noted any involuntary movements.  She cannot recall how long she has been taking Seroquel.  She denies any family history of movement disorder, denies any personal history or symptoms suggestive of stroke.  She denies any falls at home.  Remainder of review of systems otherwise negative.    Review of Systems    The 10 point Review of Systems is negative other than noted in the HPI or here.     Past Medical History    Hypothyroidism  Alcohol abuse  Depression  Chronic back pain  History of PE  Migraine headache  Diverticulosis    Past Surgical History   Cervical fusion  Incisional hernia repair  Total abdominal hysterectomy and oophorectomy  Unspecified left knee and foot surgery    Social History   C alcohol abuse as noted above.  She denies any tobacco or illicit drug use.  She is currently residing with her daughter.    Family History   Multiple family members with cancer.    Prior to Admission  Medications   Prior to Admission Medications   Prescriptions Last Dose Informant Patient Reported? Taking?   EPINEPHrine (EPIPEN/ADRENACLICK/OR ANY BX GENERIC EQUIV) 0.3 MG/0.3ML injection 2-pack   Yes Yes   Sig: Inject 0.3 mg into the muscle as needed for anaphylaxis   Lidocaine (LIDOCARE) 4 % Patch   Yes Yes   Sig: Place 1 patch onto the skin daily as needed for moderate pain To prevent lidocaine toxicity, patient should be patch free for 12 hrs daily.   PARoxetine (PAXIL) 40 MG tablet 9/26/2019 at Unknown time  No Yes   Sig: Take 1 tablet (40 mg) by mouth every morning   QUEtiapine (SEROQUEL) 200 MG tablet 9/25/2019 at Unknown time  Yes Yes   Sig: Take 200 mg by mouth At Bedtime   ZOLMitriptan (ZOMIG-ZMT) 5 MG ODT prn Self Yes Yes   Sig: Take 5 mg by mouth daily as needed for migraine (at onset of head ache)    albuterol (PROAIR HFA/PROVENTIL HFA/VENTOLIN HFA) 108 (90 Base) MCG/ACT inhaler prn  Yes Yes   Sig: Inhale 1-2 puffs into the lungs every 4 hours as needed for shortness of breath / dyspnea or wheezing   ferrous sulfate (FEROSUL) 325 (65 Fe) MG tablet Past Week at Unknown time  Yes Yes   Sig: Take 325 mg by mouth every other day   gabapentin (NEURONTIN) 300 MG capsule 9/26/2019 at x2  Yes Yes   Sig: Take 900 mg by mouth 3 times daily   hydrOXYzine (ATARAX) 25 MG tablet   Yes Yes   Sig: Take 25-50 mg by mouth 3 times daily as needed for anxiety   levothyroxine (SYNTHROID/LEVOTHROID) 75 MCG tablet 9/25/2019 at Unknown time Self Yes Yes   Sig: Take 75 mcg by mouth daily   methylphenidate (CONCERTA) 54 MG CR tablet 9/25/2019 at Unknown time  Yes Yes   Sig: Take 54 mg by mouth every morning   rivaroxaban ANTICOAGULANT (XARELTO) 20 MG TABS tablet 9/24/2019  Yes Yes   Sig: Take 20 mg by mouth daily (with dinner)      Facility-Administered Medications: None     Allergies   Allergies   Allergen Reactions     Contrast Dye Swelling     Pistachios [Nuts] Swelling     Facial swelling       Physical Exam   Vital Signs:  Temp: 98  F (36.7  C) Temp src: Oral BP: 133/87 Pulse: 101 Heart Rate: 103 Resp: 16 SpO2: 97 % O2 Device: None (Room air)    Weight: 191 lbs 0 oz    General Appearance: Well-developed, well-nourished female in no acute distress  Eyes: Pupils equal, round and reactive to light, sclera anicteric  HEENT: Mucous members moist, no neck lymphadenopathy  Respiratory: Lungs clear to auscultation bilaterally, no wheezes or crackles, no tachypnea  Cardiovascular: Regular and rhythm, normal S1/S2, no murmurs, rubs or gallops  GI: Abdomen soft, nontender, nondistended, normal bowel sounds  Lymph/Hematologic: No peripheral edema  Skin: No rash or bruising  Musculoskeletal: Extremities warm well perfused  Neurologic: Alert and appropriate, cranial nerves II through XII intact, very fidgety but is able to hold still for approximately 1 minute when instructed to do so  Psychiatric: anxious affect    Data   Data reviewed today: I reviewed all medications, new labs and imaging results over the last 24 hours. I personally reviewed no images or EKG's today.    Recent Labs   Lab 09/26/19  1449   WBC 5.3   HGB 11.5*   MCV 84         POTASSIUM 3.9   CHLORIDE 113*   CO2 28   BUN 14   CR 0.78   ANIONGAP 2*   MART 8.5   *   ALBUMIN 3.6   PROTTOTAL 7.2   BILITOTAL 0.1*   ALKPHOS 153*   ALT 24   AST 27

## 2019-09-27 ENCOUNTER — APPOINTMENT (OUTPATIENT)
Dept: OCCUPATIONAL THERAPY | Facility: CLINIC | Age: 64
End: 2019-09-27
Attending: HOSPITALIST
Payer: COMMERCIAL

## 2019-09-27 PROBLEM — Z86.14 HX MRSA INFECTION: Status: ACTIVE | Noted: 2019-09-27

## 2019-09-27 PROBLEM — F10.239 ALCOHOL DEPENDENCE WITH WITHDRAWAL WITH COMPLICATION (H): Status: ACTIVE | Noted: 2018-12-19

## 2019-09-27 PROBLEM — Z86.711 HISTORY OF PULMONARY EMBOLISM: Status: ACTIVE | Noted: 2019-09-27

## 2019-09-27 PROBLEM — F10.939 ALCOHOL WITHDRAWAL (H): Status: RESOLVED | Noted: 2019-09-26 | Resolved: 2019-09-27

## 2019-09-27 LAB
ALBUMIN SERPL-MCNC: 2.9 G/DL (ref 3.4–5)
ALP SERPL-CCNC: 130 U/L (ref 40–150)
ALT SERPL W P-5'-P-CCNC: 21 U/L (ref 0–50)
ANION GAP SERPL CALCULATED.3IONS-SCNC: 4 MMOL/L (ref 3–14)
AST SERPL W P-5'-P-CCNC: 19 U/L (ref 0–45)
BILIRUB DIRECT SERPL-MCNC: <0.1 MG/DL (ref 0–0.2)
BILIRUB SERPL-MCNC: 0.2 MG/DL (ref 0.2–1.3)
BUN SERPL-MCNC: 12 MG/DL (ref 7–30)
CALCIUM SERPL-MCNC: 8.3 MG/DL (ref 8.5–10.1)
CHLORIDE SERPL-SCNC: 108 MMOL/L (ref 94–109)
CO2 SERPL-SCNC: 28 MMOL/L (ref 20–32)
CREAT SERPL-MCNC: 0.81 MG/DL (ref 0.52–1.04)
ERYTHROCYTE [DISTWIDTH] IN BLOOD BY AUTOMATED COUNT: 17 % (ref 10–15)
GFR SERPL CREATININE-BSD FRML MDRD: 77 ML/MIN/{1.73_M2}
GLUCOSE SERPL-MCNC: 112 MG/DL (ref 70–99)
HCT VFR BLD AUTO: 34.2 % (ref 35–47)
HGB BLD-MCNC: 10.5 G/DL (ref 11.7–15.7)
MCH RBC QN AUTO: 25.9 PG (ref 26.5–33)
MCHC RBC AUTO-ENTMCNC: 30.7 G/DL (ref 31.5–36.5)
MCV RBC AUTO: 84 FL (ref 78–100)
PLATELET # BLD AUTO: 237 10E9/L (ref 150–450)
POTASSIUM SERPL-SCNC: 4.1 MMOL/L (ref 3.4–5.3)
PROT SERPL-MCNC: 6.3 G/DL (ref 6.8–8.8)
PTH-INTACT SERPL-MCNC: 40 PG/ML (ref 12–64)
RBC # BLD AUTO: 4.06 10E12/L (ref 3.8–5.2)
SODIUM SERPL-SCNC: 140 MMOL/L (ref 133–144)
WBC # BLD AUTO: 4.7 10E9/L (ref 4–11)

## 2019-09-27 PROCEDURE — 25000132 ZZH RX MED GY IP 250 OP 250 PS 637: Performed by: HOSPITALIST

## 2019-09-27 PROCEDURE — 25800030 ZZH RX IP 258 OP 636: Performed by: HOSPITALIST

## 2019-09-27 PROCEDURE — 85027 COMPLETE CBC AUTOMATED: CPT | Performed by: HOSPITALIST

## 2019-09-27 PROCEDURE — 97165 OT EVAL LOW COMPLEX 30 MIN: CPT | Mod: GO | Performed by: OCCUPATIONAL THERAPIST

## 2019-09-27 PROCEDURE — 80048 BASIC METABOLIC PNL TOTAL CA: CPT | Performed by: HOSPITALIST

## 2019-09-27 PROCEDURE — 99253 IP/OBS CNSLTJ NEW/EST LOW 45: CPT | Performed by: PSYCHIATRY & NEUROLOGY

## 2019-09-27 PROCEDURE — 99232 SBSQ HOSP IP/OBS MODERATE 35: CPT | Performed by: INTERNAL MEDICINE

## 2019-09-27 PROCEDURE — 25000132 ZZH RX MED GY IP 250 OP 250 PS 637: Performed by: PSYCHIATRY & NEUROLOGY

## 2019-09-27 PROCEDURE — 97535 SELF CARE MNGMENT TRAINING: CPT | Mod: GO | Performed by: OCCUPATIONAL THERAPIST

## 2019-09-27 PROCEDURE — 25000132 ZZH RX MED GY IP 250 OP 250 PS 637: Performed by: INTERNAL MEDICINE

## 2019-09-27 PROCEDURE — 36415 COLL VENOUS BLD VENIPUNCTURE: CPT | Performed by: HOSPITALIST

## 2019-09-27 PROCEDURE — 12000000 ZZH R&B MED SURG/OB

## 2019-09-27 PROCEDURE — 80076 HEPATIC FUNCTION PANEL: CPT | Performed by: HOSPITALIST

## 2019-09-27 RX ORDER — TRAZODONE HYDROCHLORIDE 50 MG/1
50 TABLET, FILM COATED ORAL 2 TIMES DAILY PRN
Status: DISCONTINUED | OUTPATIENT
Start: 2019-09-27 | End: 2019-09-27

## 2019-09-27 RX ORDER — PAROXETINE 40 MG/1
40 TABLET, FILM COATED ORAL AT BEDTIME
Status: DISCONTINUED | OUTPATIENT
Start: 2019-09-27 | End: 2019-10-01 | Stop reason: HOSPADM

## 2019-09-27 RX ORDER — METHYLPHENIDATE HYDROCHLORIDE 54 MG/1
54 TABLET ORAL
Status: DISCONTINUED | OUTPATIENT
Start: 2019-09-27 | End: 2019-10-01 | Stop reason: HOSPADM

## 2019-09-27 RX ADMIN — Medication 100 MG: at 08:53

## 2019-09-27 RX ADMIN — FOLIC ACID 1 MG: 1 TABLET ORAL at 08:54

## 2019-09-27 RX ADMIN — SENNOSIDES AND DOCUSATE SODIUM 2 TABLET: 8.6; 5 TABLET ORAL at 09:09

## 2019-09-27 RX ADMIN — PAROXETINE 40 MG: 40 TABLET, FILM COATED ORAL at 08:53

## 2019-09-27 RX ADMIN — HYDROXYZINE HYDROCHLORIDE 25 MG: 25 TABLET ORAL at 16:33

## 2019-09-27 RX ADMIN — GABAPENTIN 900 MG: 300 CAPSULE ORAL at 15:22

## 2019-09-27 RX ADMIN — RIVAROXABAN 20 MG: 20 TABLET, FILM COATED ORAL at 15:22

## 2019-09-27 RX ADMIN — PAROXETINE 40 MG: 40 TABLET, FILM COATED ORAL at 20:19

## 2019-09-27 RX ADMIN — GABAPENTIN 900 MG: 300 CAPSULE ORAL at 20:18

## 2019-09-27 RX ADMIN — SODIUM CHLORIDE: 9 INJECTION, SOLUTION INTRAVENOUS at 06:15

## 2019-09-27 RX ADMIN — DIAZEPAM 10 MG: 5 TABLET ORAL at 08:53

## 2019-09-27 RX ADMIN — LEVOTHYROXINE SODIUM 75 MCG: 75 TABLET ORAL at 06:16

## 2019-09-27 RX ADMIN — HYDROXYZINE HYDROCHLORIDE 25 MG: 25 TABLET ORAL at 15:24

## 2019-09-27 RX ADMIN — TRAZODONE HYDROCHLORIDE 75 MG: 150 TABLET ORAL at 20:19

## 2019-09-27 RX ADMIN — MULTIPLE VITAMINS W/ MINERALS TAB 1 TABLET: TAB at 08:53

## 2019-09-27 RX ADMIN — LIDOCAINE 1 PATCH: 560 PATCH PERCUTANEOUS; TOPICAL; TRANSDERMAL at 08:48

## 2019-09-27 RX ADMIN — DIAZEPAM 10 MG: 5 TABLET ORAL at 20:27

## 2019-09-27 RX ADMIN — GABAPENTIN 900 MG: 300 CAPSULE ORAL at 08:54

## 2019-09-27 RX ADMIN — TRAZODONE HYDROCHLORIDE 25 MG: 50 TABLET ORAL at 12:18

## 2019-09-27 ASSESSMENT — ACTIVITIES OF DAILY LIVING (ADL)
ADLS_ACUITY_SCORE: 11
PREVIOUS_RESPONSIBILITIES: MEAL PREP;HOUSEKEEPING;LAUNDRY;SHOPPING;MEDICATION MANAGEMENT;FINANCES;DRIVING
ADLS_ACUITY_SCORE: 11

## 2019-09-27 NOTE — PLAN OF CARE
"Discharge Planner OT   Patient plan for discharge: Return to CD treatment facility  Current status: OT eval completed. Pt pleasant and cooperative and follows all directions. Did observe \"fidgety\"/extraneous movements. Pt reports she has had these movements \"for years\" and she is self-conscious of them. She completed bed mobility, toileting, ambulation around unit with supervision only. Steady on feet despite fidgety movements. 5/5 strength in BUE. Completed SLUMS with score of 27/30, indicating normal score. Pt expressed feeling very anxious and reports using alcohol as a way to cope with her anxiety. Reports she got much worse after caring for her mom who had throat cancer for 4 years. Endorses difficulty with finding the right medication that will help her with her symptoms of anxiety. No further skilled therapy indicated; pt should continue to ambulate with nursing staff. Inpt PT not warranted.  Barriers to return to prior living situation: none from OT standpoint  Recommendations for discharge: Home or CD facility (per medical team), with supervision for medication management  Rationale for recommendations: Pt does not require continued PT/OT, lives in an accessible apt with elevator access, scored WNL on her SLUMS.       Entered by: Julia Belcher 09/27/2019 8:47 AM       "

## 2019-09-27 NOTE — CONSULTS
2019    Writer met with pt to discuss CD options. Pt inquired about [facility], but was concerned about the wait list. We discussed [facility] and how she has been there before and would like to return there. Together, we called [facility] to see what needs to be done. Pt (with SW or team assistance) needs to:  1) Call her insurance and she needs to sign some sort of release with them as it has .   2) Open up a case with her insurance (done by calling).   3) Bernyjennydeenayani needs progress notes, MAR, etc sent to them when she is getting close to discharge.     Pt reported she would talk to her  about the co-pay (was $800 last time) and hopes to discharge straight to treatment. Pt reported she had been to [facility], but a chart review indicates she didn't go there and went to [facility] instead. Pt's insurance would not cover the lodging portion of treatment. Based on her insurance and her baseline functioning, pt would not be a good candidate for [facility], but she would be good for [facility]. Pt was anxious about discharging, wants to discharge straight to treatment. Writer assured her that it looks like she wasn't discharging today. Pt asked writer to stop by tomorrow as well. Writer said she would do that.     [facility]  [facility] [phone]  [place] [phone]    Jo-Ann Gonzalez [facility] [place]  [phone]

## 2019-09-27 NOTE — PROGRESS NOTES
[facility]    Hospitalist Progress Note    Assessment & Plan   Kay Dunham is a 64 year old female admitted on 9/26/2019.  Past history of alcohol abuse, depression, hypothyroidism, chronic back pain who presents with alcohol withdrawal, seeking help to regain sobriety.     Alcohol abuse with acute alcohol withdrawal  Drinking 1.5 pints whiskey daily, last drink morning of 9/26 with BAL of 0.17 at admission.  Presents with mild tachycardia and tremor, reporting symptoms of withdrawal.  Wishes to re-enter treatment program.  - Psych and CD consulted  - CIWA protocol  - oral thiamine, folate, MVI     Possible chorea  - neuro consult -- not thought to be significant  - hold PTA Seroquel for now  - check B12, TSH, PTH, magnesium     Elevated alkaline phos  Alk phos 153 at admission, bili/AST/ALT wnl.    - repeat normal at 130     Depression  - hold seroquel as above  - Psych consulted -- Paxil at bedtime, stop Seroquel and start Trazodone 75 mg at bedtime.     Plan:  Anticipate discharge to [facility] tomorrow if place available, otherwise may need to discharge home until place becomes available.      DVT Prophylaxis: Xarelto   Code Status: Full Code    Disposition: Expected discharge home or to chem dep in 1 day.  Spoke with  (Kip) -- advised him to remove alcohol from the house.  He will be here this evening to discuss it with her.  Also, she tends to drink with her daughte -- who currently is not staying at the house -- but  needs to tell daughter she can not drink in the house.        James Xiao MD  Pager [phone]  Cell Phone [phone]  Text Page (7am to 6pm)    Interval History   Feels better today.  Appreciated psych evaluation.      Physical Exam   Temp: 98.2  F (36.8  C) Temp src: (P) Oral BP: (P) 139/88 Pulse: 87 Heart Rate: (P) 90 Resp: (P) 18 SpO2: (P) 96 % O2 Device: (P) None (Room air)    Vitals:    09/26/19 1353   Weight: 86.6 kg (191 lb)     Vital  Signs with Ranges  Temp:  [98.1  F (36.7  C)-98.6  F (37  C)] 98.2  F (36.8  C)  Pulse:  [] 87  Heart Rate:  [90-97] (P) 90  Resp:  [16-18] (P) 18  BP: (112-134)/(54-83) (P) 139/88  SpO2:  [90 %-97 %] (P) 96 %  I/O last 3 completed shifts:  In: 120 [P.O.:120]  Out: -     # Pain Assessment:  Current Pain Score 9/27/2019   Patient currently in pain? denies   Pain score (0-10) -   Kay blankenship pain level was assessed and she currently denies pain.        Constitutional: Awake, alert, cooperative, no apparent distress  Respiratory: Clear to auscultation bilaterally, no crackles or wheezing  Cardiovascular: Regular rate and rhythm, normal S1 and S2, and no murmur noted  GI: Normal bowel sounds, soft, non-distended, non-tender  Extrem: No calf tenderness, no ankle edema  Neuro: Ox3, no focal motor or sensory deficits, is restless with continued jerking movement of her extremities     Medications     - MEDICATION INSTRUCTIONS -       sodium chloride 100 mL/hr at 09/27/19 0615       folic acid  1 mg Oral Daily     gabapentin  900 mg Oral TID     [START ON 9/28/2019] influenza vaccine adult (product based on age)  0.5 mL Intramuscular Prior to discharge     levothyroxine  75 mcg Oral QAM AC     lidocaine  1 patch Transdermal Q24H     lidocaine   Transdermal Q8H     lidocaine   Transdermal Q24h     multivitamin w/minerals  1 tablet Oral Daily     [START ON 9/28/2019] PARoxetine  40 mg Oral At Bedtime     rivaroxaban ANTICOAGULANT  20 mg Oral Daily with supper     sodium chloride (PF)  3 mL Intracatheter Q8H     traZODone  75 mg Oral At Bedtime     vitamin B1  100 mg Oral Daily       Data   Recent Labs   Lab 09/27/19  0813 09/26/19  1449   WBC 4.7 5.3   HGB 10.5* 11.5*   MCV 84 84    289    143   POTASSIUM 4.1 3.9   CHLORIDE 108 113*   CO2 28 28   BUN 12 14   CR 0.81 0.78   ANIONGAP 4 2*   MART 8.3* 8.5   * 108*   ALBUMIN 2.9* 3.6   PROTTOTAL 6.3* 7.2   BILITOTAL 0.2 0.1*   ALKPHOS 130 153*   ALT 21 24    AST 19 27       Imaging:   No results found for this or any previous visit (from the past 24 hour(s)).

## 2019-09-27 NOTE — PROGRESS NOTES
09/27/19 0819   Quick Adds   Type of Visit Initial Occupational Therapy Evaluation   Living Environment   Lives With spouse  (out at work during day)   Living Arrangements apartment   Living Environment Comment Lives in a 3rd floor apt with elevator; has a tub/shower. Has 2 cats   Self-Care   Equipment Currently Used at Home none   Functional Level   Ambulation 0-->independent   Transferring 0-->independent   Toileting 0-->independent   Bathing 0-->independent   Dressing 0-->independent   Eating 0-->independent   Communication 0-->understands/communicates without difficulty   Swallowing 0-->swallows foods/liquids without difficulty   Cognition 0 - no cognition issues reported   Fall history within last six months no  (denies)   Prior Functional Level Comment Reports driving, does own meds, meals, shopping   General Information   Onset of Illness/Injury or Date of Surgery - Date 09/26/19   Referring Physician Dr. Giang   Patient/Family Goals Statement Return to [facility] treatment   Additional Occupational Profile Info/Pertinent History of Current Problem Kay Dunham is a 64 year old female admitted on 9/26/2019.  Past history of alcohol abuse, depression, hypothyroidism, chronic back pain who presents with alcohol withdrawal, seeking help to regain sobriety   Precautions/Limitations fall precautions   Cognitive Status Examination   Orientation orientation to person, place and time   Cognitive Comment not sure of exact date   Visual Perception   Visual Perception Wears glasses   Sensory Examination   Sensory Quick Adds No deficits were identified   Pain Assessment   Patient Currently in Pain No   Range of Motion (ROM)   ROM Quick Adds No deficits were identified   Strength   Manual Muscle Testing Quick Adds No deficits were identified   Coordination   Coordination Comments Has fidgety/extraneous movements but able to use a pen to write, do all ADL/IADL   Mobility   Bed Mobility Comments (I) supine<>EOB    Transfer Skill: Sit to Stand   Level of [place]: Sit/Stand independent   Physical Assist/Nonphysical Assist: Sit/Stand supervision   Transfer Skill: Toilet Transfer   Level of [place]: Toilet independent   Physical Assist/Nonphysical Assist: Toilet supervision   Balance   Balance Comments (I)ly ambulated approx 150' in hallway, no LOB   Upper Body Dressing   Level of [place]: Dress Upper Body independent   Lower Body Dressing   Level of [place]: Dress Lower Body independent   Physical Assist/Nonphysical Assist: Dress Lower Body supervision   Toileting   Level of [place]: Toilet independent   Physical Assist/Nonphysical Assist: Toilet supervision   Grooming   Level of [place]: Grooming independent   Eating/Self Feeding   Level of [place]: Eating independent   Instrumental Activities of Daily Living (IADL)   Previous Responsibilities meal prep;housekeeping;laundry;shopping;medication management;finances;driving   Activities of Daily Living Analysis   Impairments Contributing to Impaired Activities of Daily Living fear and anxiety  (ETOH abuse, extraneous/fidgety movements, severe anxiety)   General Therapy Interventions   Planned Therapy Interventions cognition;ADL retraining   Clinical Impression   Criteria for Skilled Therapeutic Interventions Met yes, treatment indicated   OT Diagnosis Impaired IADL    Influenced by the following impairments severe anxiety and long standing ETOH abuse   Assessment of Occupational Performance 1-3 Performance Deficits   Identified Performance Deficits IADL   Clinical Decision Making (Complexity) Low complexity   Therapy Frequency Other (see comments)   Predicted Duration of Therapy Intervention (days/wks) 1x eval/treatment session   Anticipated Discharge Disposition Home;Other (see comments)   Risks and Benefits of Treatment have been explained. Yes   Patient, Family & other staff in agreement with plan of care Yes   Clinical Impression Comments  May return to CD treatment facility   Total Evaluation Time   Total Evaluation Time (Minutes) 15

## 2019-09-27 NOTE — PROGRESS NOTES
"SPIRITUAL HEALTH SERVICES Progress Note  [facility] 67    Initial visit per HCD Pt expressed being frustrated and not being able to have her medication. SH listened to her concerns. Pt asked SH to leave because pt \"was not in a good mood and didn't feel like talking.\" SH assessed that it would be inappropriate to go over a HCD at this point.     Aster Sawyer   Intern  "

## 2019-09-27 NOTE — PROGRESS NOTES
Spoke to , Andrew, on the phone today. Sounds like this patient and family would benefit from SW/CC. Pt was at [facility] and  expressed dissatisfaction with the tx, however pt wants to go back there for tx.  also expressed concerns about cost and affording tx. Family may benefit from additional information about other options for tx and financial resources.

## 2019-09-27 NOTE — PROGRESS NOTES
Admission    Patient arrives to room 603-2 via cart from ED.  Care plan note: done    Inpatient nursing criteria listed below were met:    PCD's Documented: NA  Skin issues/needs documented :Yes  Isolation education started/completed NA  Patient allergies verified with patient: Yes  Verified completion of [place] Risk Assessment Tool:  Yes  Verified completion of Guardianship screening tool: No  Fall Prevention: Care plan updated, Education given and documented Yes  Care Plan initiated: Yes  Home medications documented in belongings flowsheet: Yes  Patient belongings documented in belongings flowsheet: Yes  Reminder note (belongings/ medications) placed in discharge instructions:Yes  Admission profile/ required documentation complete: Yes  Bedside Report Letter given and explained to patient Yes

## 2019-09-27 NOTE — PLAN OF CARE
DATE & TIME: 9/27 [id number]        Cognitive Concerns/ Orientation : A&Ox4, anxious at times. Patient is fidgety, restless, jerking movements (not new)   BEHAVIOR & AGGRESSION TOOL COLOR: green  CIWA SCORE: 9/6/6     ABNL VS/O2: VS on RA, LS diminished  MOBILITY: SBA  PAIN MANAGMENT: Denied- PTA gabapentin for chronic pain  DIET: Reg, good PO  BOWEL/BLADDER: senna 2 tabs given for constipation, voiding fine  ABNL LAB/BG:   DRAIN/DEVICES: PIV SL  TELEMETRY RHYTHM: n/a  SKIN: Scattered bruises, scabs  TESTS/PROCEDURES: n/a  D/C DAY/GOALS/PLACE: Pending poss treatment placement; CD consult, seen by Neuro and Psych. Trazadone/paxil started  OTHER IMPORTANT INFO: OT[place]PT [place] and [place]      Message left on patient's personally identified cell vm to call [phone] to schedule an appointment within the next 2 months before further refills.  Jayden Butler RN

## 2019-09-27 NOTE — PROGRESS NOTES
JALEN  I: JALEN was updated by CD counselor who reports that patient would like to discharge to [facility]. JALEN faxed referral to [phone]. JALEN called [facility] [employer] insurance to work on getting case open. [facility] requested patient contact [phone]. JALEN will update patient. CD counselor is following up on 9/28 with patient at [facility]. Patient will require prior auth for [facility]. Her co-pay is $175 per day with max out of pocket $875.    P: JALEN will continue to follow and assist as needed.    Shirley Devine, MSW, LGSW  *[phone]

## 2019-09-27 NOTE — CONSULTS
Patient seen for initial psychiatric consultation. Refer to dictated note.    Prosper Santamaria, DO

## 2019-09-27 NOTE — CONSULTS
Consult Date:  09/27/2019      CHIEF COMPLAINT:  Evaluate for abnormal movements.      HISTORY OF PRESENT ILLNESS:  Kay Dunham is a 64-year-old lady who is admitted to the hospital for issues related to alcohol, depression, anxiety and chronic lower back pain, as well as alcohol withdrawal.  It has been noted that she had been fidgety, and Neurology consult is asked to review potential abnormal movements.  The patient herself states that she is not bothered by any movements.  I asked if she has tremors.  She says no.  I asked if she feels fidgety.  She stated yes, she does.  She does not seem to describe any movements that she cannot control or she cannot stop.  Her major issues on this admission were alcohol consumption with her desire to seek sobriety, anxiety and depression for which she was seen by Psychiatrist, Dr. Santamaria, with adjustment of some of the medications.      Here in the hospital, review of the chart shows that she had fairly normal liver function tests, mild anemia on CBC, normal electrolytes as far as especially creatinine and sodium.  Vitamin B12 had been low normal.      The patient on admission had been on several medications including albuterol, iron sulfate, gabapentin, hydroxyzine, lidocaine, methylphenidate, Concerta, paroxetine, Seroquel, Xarelto and Zomig.   Seroquel was discontinued.      Overall, she is improving and planning to go to a treatment facility.  Her diagnosis and past medical history show prior alcohol disorder, major depressive disorder, anxiety, sleep difficulties, hypothyroidism, and chronic lower back pain.  It looks like she also has attention problems, for which she takes stimulants.      ALLERGIES:  CONTRAST DYE AND PISTASCHIOS.      SOCIAL HISTORY:  She consumes alcohol.  Does not smoke.  Lives with her daughter.      FAMILY HISTORY:  Shows cancer.      PAST SURGICAL HISTORY:  Cervical fusion, hernia surgery, hysterectomy and left knee and foot surgery.      REVIEW  OF ORGANS AND SYSTEMS:  As mentioned above.  Otherwise, negative to all other systems.      PHYSICAL EXAMINATION:   VITAL SIGNS:  Blood pressure is 138/88, heart rate 90, respiratory rate 18, temperature 98.2.   GENERAL:  She is a little bit restless but otherwise in no acute distress.   LUNGS:  Clear to auscultation bilaterally.   CARDIOVASCULAR:  S1, S2 cardiac sounds with no murmurs or bruits.   EXTREMITIES:  Warm.  Pedal pulses are present.  There is no evidence of pedal edema.   NEUROLOGIC:  The patient is awake and alert x 3.  Speech and language functions are clear.  Motor function shows symmetrical bulk and tone in both upper and lower extremities.  Reflexes are brisk.  Ankle jerks are trace.  Toes are downgoing.  Sensory examination is intact for finger-nose-finger.  Coordination exam shows no evidence of ataxia on finger-nose-finger or heel-to-shin.  Muscle tone is normal.  I do not see any choreiform movements.  I do not see any asterixis.  Speed of fine finger motion is symmetrical bilaterally.  Overall, at this point, I do not see any abnormal movements.  Gait shows pretty good balance, mild difficulty with tandem.      IMPRESSION:  This lady presents with some question of abnormal movements.  Most likely, they represent tremors and potential hand tremors by medications like methylphenidate and Paxil.  Overall, at this point, I do not see any separate evidence of movement disorder.  From a neurological standpoint, I do not recommend any additional interventions.  I discussed at this point with the patient.  Please call if any additional questions come for Neurology.  Otherwise, I will not plan to follow the patient on this hospitalization.         LASHON DIXON MD             D: 2019   T: 2019   MT:       Name:     VISH DELONG   MRN:      [id number]        Account:       [id number]   :      1955           Consult Date:  2019      Document: [id number]

## 2019-09-27 NOTE — PLAN OF CARE
DATE & TIME: 2300-0730                 Cognitive Concerns/ Orientation : A&Ox4, anxious at times. Patient is fidgety- neuro and psych consults   BEHAVIOR & AGGRESSION TOOL COLOR: green  CIWA SCORE: 2 and 2          ABNL VS/O2: VS on RA  MOBILITY: SBA  PAIN MANAGMENT: Denied- PTA gabapentin for chronic pain  DIET: Reg  BOWEL/BLADDER: WDL  ABNL LAB/BG: REYES 0.17  DRAIN/DEVICES: PIV infusing NS at 100  TELEMETRY RHYTHM: n/a  SKIN: Scattered bruises, cuts, scabs  TESTS/PROCEDURES: n/a  D/C DAY/GOALS/PLACE: Pending poss treatment placement; CD consult  OTHER IMPORTANT INFO: OT/PT/Neuro/Psych/CD/Adv Directive consults in place

## 2019-09-27 NOTE — PLAN OF CARE
DATE & TIME: 9/26 4539-2997    Cognitive Concerns/ Orientation : A&O4, cooperative.  BEHAVIOR & AGGRESSION TOOL COLOR: green. Pt is fidgety and moves a lot. Per , this is baseline behavior but seems to worsen with EtOH and Rx meds.  CIWA SCORE: 9, 7. Very fidgety/restless   ABNL VS/O2: tachy at times. VSS on RA  MOBILITY: SBA.   PAIN MANAGMENT: chronic back pain. Lidocaine patch off on arrival to unit.  DIET: regular  BOWEL/BLADDER: continent. Pt reports no BM for the last 3 days, states she is not passing gas. Denies tenderness in stomach.  ABNL LAB/BG: EtOH 0.17. negative for drugs.  DRAIN/DEVICES: PIV running NS 100mL/hr  TELEMETRY RHYTHM: NA  SKIN: scattered bruising, cuts, and scabs.  TESTS/PROCEDURES: NA  D/C DAY/GOALS/PLACE: pending progress. Pt would like to go to treatment.   OTHER IMPORTANT INFO: Hx of hiatal hernia has begun to make swallowing a bit more difficult, per .

## 2019-09-27 NOTE — PLAN OF CARE
Discharge Planner PT   Patient plan for discharge: Per OT, return to CD  Current status:   Eval orders received, chart reviewed. Pt is at her baseline for mobility per OT, no skilled IP PT warranted. PT will complete order   Barriers to return to prior living situation: defer to OT    Recommendations for discharge: Defer to OT  Rationale for recommendations: Defer to OT       Entered by: Zoey Hill 09/27/2019 9:26 AM

## 2019-09-27 NOTE — CONSULTS
"Consult Date:  09/27/2019      REASON FOR CONSULTATION:  Alcohol use disorder.      REQUESTING PHYSICIAN:  Eddie Giang MD      CHIEF COMPLAINT:  Alcohol use disorder.      HISTORY OF PRESENT ILLNESS:  Kay Dunham is a 64-year-old female with a psychiatric history of alcohol use disorder and depression and a medical history to include hypothyroidism and chronic back pain who presented to the hospital with alcohol withdrawal and desire to get sober.  On admission, the patient conceded to drinking 1-1/2 pints of whiskey on a daily basis with her last drink the morning of admission on 09/26 and a blood alcohol level of 0.17.  She was mildly tachycardic and tremulous and was initiated on the CIWA protocol and has been administered diazepam in response.  Additionally, the primary service noted choreiform movements that have reportedly been present for several months, the nidus of which is not clear.  Her prior to admission Seroquel has been held and Neurology has been consulted as well.      On my interview, the patient tells me that her mental health has been stable, though she agrees that her drinking has resumed with good intensity.  She notes her most recent period of sobriety was after completing inpatient treatment at [facility] in December and maintained about 5 months of sobriety, though states that she was visiting with her daughter who had some hard alcohol and she said that she \"had a drink and that was that.\"  She notes no overt stressors leading to the drinking instead citing that she just did not stay connected to her sober community.  She notes a strong sober support system, though, in letting it lapse, she found that her drinking had returned to dyscontrolled levels.  She notes that she has been on Paxil for many, many years and states that the most effective regimen for her mental health thus far has been a combination of Paxil and trazodone.  However, a few months ago, she notes she was transitioned to " Seroquel 200 mg at bedtime with allowance for 25 mg throughout the day for anxiety.  She notes that the trazodone was very helpful for many years at 50 mg, though lost a bit of its luster and she tried 75 mg with benefit, though was advised that Seroquel would be a safer long-term medication.  She does not think she has trialled any other antipsychotics in the past, though olanzapine rang a bell but she really had no further details on this matter.  She has had longstanding sleep complaints.  The newly initiated Seroquel conferred about 25 pounds of weight gain as well as grogginess and no benefit for anxiety.  She denies any safety concerns.  She does express a desire to return back to chemical dependency treatment.  She does wonder about some medication adjustment, namely taking her paroxetine in the evening as she finds it sedating during the day as well.      PAST PSYCHIATRIC HISTORY:  As per HPI.      PAST CHEMICAL DEPENDENCY HISTORY:  As per HPI.      PAST MEDICAL HISTORY:  As per HPI.      FAMILY HISTORY:  Reviewed in EMR.      SOCIAL HISTORY:  Reviewed in EMR.      REVIEW OF SYSTEMS:  Reviewed from admitting H and P.      ALLERGIES:  CONTRAST DYE AND PISTACHIOS.      PRIOR TO ADMISSION MEDICATIONS:   1.  Albuterol.   2.  epinephrine.   3.  Ferrous sulfate.   4.  Gabapentin 900 mg t.i.d.   5.  Hydroxyzine 25-50 mg t.i.d. p.r.n. anxiety.    6.  Levothyroxine.     7.  Lidocaine.   8.  Methylphenidate 54 mg.   9.  Concerta 54 mg daily.   10.  Paroxetine 40 mg daily.   11.  Seroquel 200 mg at bedtime.   12.  Xarelto.   13.  Zolmitriptan.      MENTAL STATUS EXAMINATION:  Age appearing female situationally appropriate grooming and hygiene.  Calm and cooperative with good eye contact.  Awake, alert and globally oriented.  Cooperative, forthcoming, and a seemingly reliable historian.  Mood was described as anxious.  Affect was stable, bright and appropriately reactive.  Speech was fluent, spontaneous clear,  nonpressured.  Thoughts were linear, logical and goal directed.  No observation of delusional content.  No observation of response to internal stimuli.  No fluctuation in cognition appreciated.  Intelligence estimate is average.  Memory grossly intact.  Attendance and concentration well maintained.  Muscle strength and tone not assessed.  Coordination, station and gait not assessed.  Insight and judgment are intact.  Denies suicidal or homicidal ideation, intent or plan.      VITAL SIGNS:  Temperature 98.2, pulse 87, respirations 16, blood pressure 118/54, oxygen saturation 95%.      DIAGNOSES:   1.  Alcohol use disorder leading to alcohol withdrawal.   2.  Major depressive disorder by history.   3.  Unspecified anxiety disorder.   4.  Sleep difficulties.   5.  Hypothyroidism.   6.  Chronic back pain.      IMPRESSION:  Kay Dunham is a 64-year-old female with psychiatric history of depression and alcohol use disorder, admitted to the hospital for management of alcohol withdrawal.  The patient also expressed desire to resume chemical dependency treatment.  Had her most recent treatment at [facility] in 12/2018 for a 30-day inpatient stay.  Notes that she maintained sobriety for 5 months, though cites not staying connected with a sober community is leading to poor decisions, which led to her resuming drinking estimated 1-1/2 pints of whiskey a day.  The patient strongly desires pursuit of chemical dependency treatment again and we would ask that social work facilitate connecting her with [facility] or other relevant treatment options depending upon her insurance.  With respect to medications, we are going to recommend permanent cessation of Seroquel at this time and substitute with previously effective trazodone at a slightly higher dose of 75 mg at night.  We will also transition her paroxetine dosing to evening given its sedating qualities.      PLAN:   1.  Recommend social work connected with the patient for  referral for chemical dependency resources (has participated in [facility] in the past and believes she is eligible to return).   2.  Permanently discontinued Seroquel from her outpatient regimen and substituted with Trazodone 75 mg at bedtime.   3.  Transition Paroxetine 40 mg daily dosing to bedtime dosing.   4.  No further interventions from Psychiatry anticipated, reconsult as needed.         CHIVO WOODRUFF DO             D: 2019   T: 2019   MT: HAN      Name:     VISH DELONG   MRN:      [id number]        Account:       [id number]   :      1955           Consult Date:  2019      Document: [id number]       cc: Erick Hunter MD

## 2019-09-28 LAB
CREAT SERPL-MCNC: 0.78 MG/DL (ref 0.52–1.04)
GFR SERPL CREATININE-BSD FRML MDRD: 81 ML/MIN/{1.73_M2}

## 2019-09-28 PROCEDURE — 25000132 ZZH RX MED GY IP 250 OP 250 PS 637: Performed by: INTERNAL MEDICINE

## 2019-09-28 PROCEDURE — 82565 ASSAY OF CREATININE: CPT | Performed by: HOSPITALIST

## 2019-09-28 PROCEDURE — 12000000 ZZH R&B MED SURG/OB

## 2019-09-28 PROCEDURE — 99232 SBSQ HOSP IP/OBS MODERATE 35: CPT | Performed by: INTERNAL MEDICINE

## 2019-09-28 PROCEDURE — 25000132 ZZH RX MED GY IP 250 OP 250 PS 637: Performed by: HOSPITALIST

## 2019-09-28 PROCEDURE — 25000132 ZZH RX MED GY IP 250 OP 250 PS 637: Performed by: PSYCHIATRY & NEUROLOGY

## 2019-09-28 PROCEDURE — 36415 COLL VENOUS BLD VENIPUNCTURE: CPT | Performed by: HOSPITALIST

## 2019-09-28 RX ORDER — PROPRANOLOL HYDROCHLORIDE 10 MG/1
10 TABLET ORAL 3 TIMES DAILY
Status: DISCONTINUED | OUTPATIENT
Start: 2019-09-28 | End: 2019-10-01 | Stop reason: HOSPADM

## 2019-09-28 RX ORDER — MAGNESIUM CARB/ALUMINUM HYDROX 105-160MG
296 TABLET,CHEWABLE ORAL ONCE
Status: COMPLETED | OUTPATIENT
Start: 2019-09-28 | End: 2019-09-28

## 2019-09-28 RX ORDER — CALCIUM CARBONATE 500 MG/1
1000 TABLET, CHEWABLE ORAL EVERY 4 HOURS PRN
Status: DISCONTINUED | OUTPATIENT
Start: 2019-09-28 | End: 2019-10-01 | Stop reason: HOSPADM

## 2019-09-28 RX ADMIN — MAGNESIUM CITRATE 296 ML: 1.75 LIQUID ORAL at 11:07

## 2019-09-28 RX ADMIN — GABAPENTIN 900 MG: 300 CAPSULE ORAL at 08:43

## 2019-09-28 RX ADMIN — SENNOSIDES AND DOCUSATE SODIUM 2 TABLET: 8.6; 5 TABLET ORAL at 06:12

## 2019-09-28 RX ADMIN — PAROXETINE 40 MG: 40 TABLET, FILM COATED ORAL at 20:04

## 2019-09-28 RX ADMIN — PROPRANOLOL HYDROCHLORIDE 10 MG: 10 TABLET ORAL at 11:07

## 2019-09-28 RX ADMIN — SENNOSIDES AND DOCUSATE SODIUM 2 TABLET: 8.6; 5 TABLET ORAL at 17:28

## 2019-09-28 RX ADMIN — RIVAROXABAN 20 MG: 20 TABLET, FILM COATED ORAL at 17:23

## 2019-09-28 RX ADMIN — TRAZODONE HYDROCHLORIDE 75 MG: 150 TABLET ORAL at 20:04

## 2019-09-28 RX ADMIN — LIDOCAINE 1 PATCH: 560 PATCH PERCUTANEOUS; TOPICAL; TRANSDERMAL at 08:44

## 2019-09-28 RX ADMIN — HYDROXYZINE HYDROCHLORIDE 50 MG: 25 TABLET ORAL at 06:14

## 2019-09-28 RX ADMIN — TRAZODONE HYDROCHLORIDE 25 MG: 50 TABLET ORAL at 08:59

## 2019-09-28 RX ADMIN — CALCIUM CARBONATE (ANTACID) CHEW TAB 500 MG 1000 MG: 500 CHEW TAB at 08:59

## 2019-09-28 RX ADMIN — PROPRANOLOL HYDROCHLORIDE 10 MG: 10 TABLET ORAL at 20:04

## 2019-09-28 RX ADMIN — GABAPENTIN 900 MG: 300 CAPSULE ORAL at 20:03

## 2019-09-28 RX ADMIN — LEVOTHYROXINE SODIUM 75 MCG: 75 TABLET ORAL at 06:14

## 2019-09-28 RX ADMIN — PROPRANOLOL HYDROCHLORIDE 10 MG: 10 TABLET ORAL at 17:23

## 2019-09-28 RX ADMIN — Medication 100 MG: at 08:43

## 2019-09-28 RX ADMIN — GABAPENTIN 900 MG: 300 CAPSULE ORAL at 17:23

## 2019-09-28 RX ADMIN — MULTIPLE VITAMINS W/ MINERALS TAB 1 TABLET: TAB at 08:43

## 2019-09-28 RX ADMIN — FOLIC ACID 1 MG: 1 TABLET ORAL at 08:43

## 2019-09-28 ASSESSMENT — ACTIVITIES OF DAILY LIVING (ADL)
ADLS_ACUITY_SCORE: 11

## 2019-09-28 NOTE — PROGRESS NOTES
"SPIRITUAL HEALTH SERVICES Progress Note  [facility]    Follow-up visit per pt request. Pt talked about feeling overwhelmed with decision making. Pt wants to go to [facility], but stated that her  does not think it will work because \"it didn't work last time.\" Pt is wanting to do what is best for her and knows that she needs to \"stick to the plan\" after [facility] to prevent relapse. Pt wanted to pray and \"leave it up to God.\" SH listened to pt's concerns and prayed with pt. SH will be available per pt request.    Aster Sawyer   Intern  "

## 2019-09-28 NOTE — PLAN OF CARE
DATE & TIME: 9/27/19 3788-8812                Cognitive Concerns/ Orientation : A&Ox4   BEHAVIOR & AGGRESSION TOOL COLOR: Green.  Restless.  CIWA SCORE: 9 for tremor, anxiety, agitation, and diaphoresis.  Valium.  0, sleeping.  3.  ABNL VS/O2: VSS on RA  MOBILITY: Independent  PAIN MANAGMENT: Denies  DIET: Tolerates regular diet  BOWEL/BLADDER: Continent  ABNL LAB/BG: N/A  DRAIN/DEVICES: PIV saline locked  TELEMETRY RHYTHM: N/A  SKIN: Intact, dusky  TESTS/PROCEDURES: N/A  D/C DAY/GOALS/PLACE: To inpatient treatment, possibly Saturday or Sunday.  OTHER IMPORTANT INFO: Baseline numbness/tingling in the hands.  PRN Senna for constipation and PRN Atarax for anxiety towards end of shift.

## 2019-09-28 NOTE — PROGRESS NOTES
"  Physical Therapy Daily Treatment Note     Name: Ana Mcghee  Clinic Number: [id number]    9/28/2019    Writer followed up with pt. Reports she hasn't called [facility] today, but followed up with insurance (can't remember what they said).. Pt reports she still wishes to attend treatment there. She said she wasn't feeling well, thanked me for stopping back in and checking on her status with [facility].     Jo-Ann Gonzalez, [facility] [place]     "Rj Mccoy, PTA

## 2019-09-28 NOTE — PROGRESS NOTES
DATE & TIME: 9/28/19 0636-6870           Cognitive Concerns/ Orientation : A&Ox4   BEHAVIOR & AGGRESSION TOOL COLOR: Green.  Restless.  CIWA SCORE: 4,4,4 for tremor, anxiety, agitation  ABNL VS/O2: VSS on RA  MOBILITY: Independent  PAIN MANAGMENT: Denies  DIET: Tolerates regular diet  BOWEL/BLADDER: Continent, mag citetrate for constipation, 4 small stools today, PRN senna given.  ABNL LAB/BG: N/A  DRAIN/DEVICES: PIV saline locked  TELEMETRY RHYTHM: N/A  SKIN: Intact, dusky  TESTS/PROCEDURES: N/A  D/C DAY/GOALS/PLACE: To inpatient treatment, possibly Sunday.  OTHER IMPORTANT INFO: Baseline numbness/tingling in the toes.  PRN Atarax and propranolol for anxiety.

## 2019-09-28 NOTE — PROGRESS NOTES
[facility]    Hospitalist Progress Note    Assessment & Plan   Kay Dunham is a 64 year old female admitted on 9/26/2019.  Past history of alcohol abuse, depression, hypothyroidism, chronic back pain who presents with alcohol withdrawal, seeking help to regain sobriety.     Alcohol abuse with acute alcohol withdrawal  Drinking 1.5 pints whiskey daily, last drink morning of 9/26 with BAL of 0.17 at admission.  Presents with mild tachycardia and tremor, reporting symptoms of withdrawal.  Wishes to re-enter treatment program.  - Psych and CD seen.   - CIWA protocol  - oral thiamine, folate, MVI     Abnormal movements  neuro seen - not thought to be significant.  B12, TSH, PTH, magnesium all nl. Pt extremely anxious -continues to have fidgity movements-biting in tissues in mouth  -will try propanolol low dose-might help.     Elevated alkaline phos  Alk phos 153 at admission, bili/AST/ALT wnl.    - repeat normal at 130     Depression  - Psych consulted -- Paxil at bedtime, stop Seroquel and start Trazodone 75 mg at bedtime.     Plan:  Anticipate discharge to [facility] tomorrow if place available-pt not comfortable going home to same situation/ atmosphere    DVT Prophylaxis: Xarelto   Code Status: Full Code    Disposition: to inpt rehab when available. Daughter in [place]. Pt not comfortable going back home to . SW on case.        Mireya Frank MD      Interval History   More anxious/ jittery. Not comfortable going home. Having lot of mouth biting -and persistent jittery movements.    Physical Exam   Temp: 98.1  F (36.7  C) Temp src: Oral BP: 126/82 Pulse: 84 Heart Rate: 83 Resp: 18 SpO2: 94 % O2 Device: None (Room air)    Vitals:    09/26/19 1353   Weight: 86.6 kg (191 lb)     Vital Signs with Ranges  Temp:  [97.6  F (36.4  C)-98.3  F (36.8  C)] 98.1  F (36.7  C)  Pulse:  [84] 84  Heart Rate:  [66-98] 83  Resp:  [16-18] 18  BP: (123-149)/(52-90) 126/82  SpO2:  [91 %-96 %] 94 %  I/O last 3  completed shifts:  In: 120 [P.O.:120]  Out: -     # Pain Assessment:  Current Pain Score 9/28/2019   Patient currently in pain? sleeping: patient not able to self report   Pain score (0-10) -   Kay s pain level was assessed and she currently denies pain.        Constitutional: Awake, alert, cooperative, no apparent distress  Respiratory: Clear to auscultation bilaterally, no crackles or wheezing  Cardiovascular: Regular rate and rhythm, normal S1 and S2, and no murmur noted  GI: Normal bowel sounds, soft, non-distended, non-tender  Extrem: No calf tenderness, no ankle edema  Neuro: Ox3, no focal motor or sensory deficits, is restless with continued jerking movement of her extremities     Medications     - MEDICATION INSTRUCTIONS -         folic acid  1 mg Oral Daily     gabapentin  900 mg Oral TID     influenza vaccine adult (product based on age)  0.5 mL Intramuscular Prior to discharge     levothyroxine  75 mcg Oral QAM AC     lidocaine  1 patch Transdermal Q24H     lidocaine   Transdermal Q8H     lidocaine   Transdermal Q24h     multivitamin w/minerals  1 tablet Oral Daily     PARoxetine  40 mg Oral At Bedtime     propranolol  10 mg Oral TID     rivaroxaban ANTICOAGULANT  20 mg Oral Daily with supper     sodium chloride (PF)  3 mL Intracatheter Q8H     traZODone  75 mg Oral At Bedtime     vitamin B1  100 mg Oral Daily       Data   Recent Labs   Lab 09/28/19  0917 09/27/19  0813 09/26/19  1449   WBC  --  4.7 5.3   HGB  --  10.5* 11.5*   MCV  --  84 84   PLT  --  237 289   NA  --  140 143   POTASSIUM  --  4.1 3.9   CHLORIDE  --  108 113*   CO2  --  28 28   BUN  --  12 14   CR 0.78 0.81 0.78   ANIONGAP  --  4 2*   MART  --  8.3* 8.5   GLC  --  112* 108*   ALBUMIN  --  2.9* 3.6   PROTTOTAL  --  6.3* 7.2   BILITOTAL  --  0.2 0.1*   ALKPHOS  --  130 153*   ALT  --  21 24   AST  --  19 27       Imaging:   No results found for this or any previous visit (from the past 24 hour(s)).

## 2019-09-29 PROCEDURE — 25000132 ZZH RX MED GY IP 250 OP 250 PS 637: Performed by: INTERNAL MEDICINE

## 2019-09-29 PROCEDURE — 25000132 ZZH RX MED GY IP 250 OP 250 PS 637: Performed by: HOSPITALIST

## 2019-09-29 PROCEDURE — 99232 SBSQ HOSP IP/OBS MODERATE 35: CPT | Performed by: INTERNAL MEDICINE

## 2019-09-29 PROCEDURE — 12000000 ZZH R&B MED SURG/OB

## 2019-09-29 PROCEDURE — 25000132 ZZH RX MED GY IP 250 OP 250 PS 637: Performed by: PSYCHIATRY & NEUROLOGY

## 2019-09-29 PROCEDURE — H0001 ALCOHOL AND/OR DRUG ASSESS: HCPCS

## 2019-09-29 PROCEDURE — 99207 ZZC CDG-MDM COMPONENT: MEETS MODERATE - UP CODED: CPT | Performed by: INTERNAL MEDICINE

## 2019-09-29 RX ADMIN — GABAPENTIN 900 MG: 300 CAPSULE ORAL at 08:37

## 2019-09-29 RX ADMIN — PAROXETINE 40 MG: 40 TABLET, FILM COATED ORAL at 20:22

## 2019-09-29 RX ADMIN — LIDOCAINE 1 PATCH: 560 PATCH PERCUTANEOUS; TOPICAL; TRANSDERMAL at 08:38

## 2019-09-29 RX ADMIN — PROPRANOLOL HYDROCHLORIDE 10 MG: 10 TABLET ORAL at 08:37

## 2019-09-29 RX ADMIN — PROPRANOLOL HYDROCHLORIDE 10 MG: 10 TABLET ORAL at 16:03

## 2019-09-29 RX ADMIN — GABAPENTIN 900 MG: 300 CAPSULE ORAL at 20:23

## 2019-09-29 RX ADMIN — SENNOSIDES AND DOCUSATE SODIUM 2 TABLET: 8.6; 5 TABLET ORAL at 08:37

## 2019-09-29 RX ADMIN — GABAPENTIN 900 MG: 300 CAPSULE ORAL at 16:03

## 2019-09-29 RX ADMIN — SENNOSIDES AND DOCUSATE SODIUM 2 TABLET: 8.6; 5 TABLET ORAL at 20:22

## 2019-09-29 RX ADMIN — LEVOTHYROXINE SODIUM 75 MCG: 75 TABLET ORAL at 05:59

## 2019-09-29 RX ADMIN — FOLIC ACID 1 MG: 1 TABLET ORAL at 08:38

## 2019-09-29 RX ADMIN — Medication 100 MG: at 08:37

## 2019-09-29 RX ADMIN — TRAZODONE HYDROCHLORIDE 75 MG: 150 TABLET ORAL at 20:22

## 2019-09-29 RX ADMIN — TRAZODONE HYDROCHLORIDE 25 MG: 50 TABLET ORAL at 16:03

## 2019-09-29 RX ADMIN — ACETAMINOPHEN 650 MG: 325 TABLET ORAL at 05:58

## 2019-09-29 RX ADMIN — MULTIPLE VITAMINS W/ MINERALS TAB 1 TABLET: TAB at 08:37

## 2019-09-29 RX ADMIN — ACETAMINOPHEN 650 MG: 325 TABLET ORAL at 11:28

## 2019-09-29 RX ADMIN — POLYETHYLENE GLYCOL 3350 17 G: 17 POWDER, FOR SOLUTION ORAL at 08:38

## 2019-09-29 RX ADMIN — PROPRANOLOL HYDROCHLORIDE 10 MG: 10 TABLET ORAL at 20:22

## 2019-09-29 RX ADMIN — RIVAROXABAN 20 MG: 20 TABLET, FILM COATED ORAL at 16:03

## 2019-09-29 RX ADMIN — ACETAMINOPHEN 650 MG: 325 TABLET ORAL at 16:03

## 2019-09-29 ASSESSMENT — ACTIVITIES OF DAILY LIVING (ADL)
ADLS_ACUITY_SCORE: 11
ADLS_ACUITY_SCORE: 12
ADLS_ACUITY_SCORE: 12
ADLS_ACUITY_SCORE: 11
ADLS_ACUITY_SCORE: 12
ADLS_ACUITY_SCORE: 11

## 2019-09-29 NOTE — PROGRESS NOTES
[facility]    Hospitalist Progress Note    Assessment & Plan   Kay Dunham is a 64 year old female admitted on 9/26/2019.  Past history of alcohol abuse, depression, hypothyroidism, chronic back pain who presents with alcohol withdrawal, seeking help to regain sobriety.     Alcohol abuse with acute alcohol withdrawal  Drinking 1.5 pints whiskey daily, last drink morning of 9/26 with BAL of 0.17 at admission.  Presents with mild tachycardia and tremor, reporting symptoms of withdrawal.  Wishes to re-enter treatment program. Psych and CD seen-will follow recs.  Pt not withdrawing hence stop  CIWA protocol  - continue oral thiamine, folate, MVI     Abnormal movements  neuro seen - not thought to be significant.  B12, TSH, PTH, magnesium all nl. Pt extremely anxious Continues to have fidgity movements-biting in tissues in mouth. Started  propanolol low dose 9/28--might help.     Elevated alkaline phos  Alk phos 153 at admission, bili/AST/ALT wnl.    Resolved to nl.     Depression  - Psych consulted -- Paxil at bedtime, stop Seroquel and start Trazodone 75 mg at bedtime.          DVT Prophylaxis: Xarelto   Code Status: Full Code    Disposition: to inpt rehab when available. Daughter in [place]. Pt not comfortable going back home to . SW on case.        Mireya Frank MD      Interval History   Still  anxious/ jittery. Not comfortable going home. -persistent jittery movements.    Physical Exam   Temp: 98.2  F (36.8  C) Temp src: Oral BP: 111/69 Pulse: 70 Heart Rate: 67 Resp: 18 SpO2: 94 % O2 Device: None (Room air)    Vitals:    09/26/19 1353   Weight: 86.6 kg (191 lb)     Vital Signs with Ranges  Temp:  [97.3  F (36.3  C)-98.5  F (36.9  C)] 98.2  F (36.8  C)  Pulse:  [70] 70  Heart Rate:  [65-78] 67  Resp:  [18] 18  BP: (111-137)/(69-87) 111/69  SpO2:  [92 %-95 %] 94 %  I/O last 3 completed shifts:  In: 420 [P.O.:420]  Out: -     # Pain Assessment:  Current Pain Score 9/29/2019   Patient currently  in pain? yes   Pain score (0-10) 7   Kay s pain level was assessed and she currently denies pain.        Constitutional: Awake, alert, cooperative, no apparent distress  Respiratory: Clear to auscultation bilaterally, no crackles or wheezing  Cardiovascular: Regular rate and rhythm, normal S1 and S2, and no murmur noted  GI: Normal bowel sounds, soft, non-distended, non-tender  Extrem: No calf tenderness, no ankle edema  Neuro: Ox3, no focal motor or sensory deficits, is restless with continued jerking movement of her extremities     Medications     - MEDICATION INSTRUCTIONS -         folic acid  1 mg Oral Daily     gabapentin  900 mg Oral TID     influenza vaccine adult (product based on age)  0.5 mL Intramuscular Prior to discharge     levothyroxine  75 mcg Oral QAM AC     lidocaine  1 patch Transdermal Q24H     lidocaine   Transdermal Q8H     lidocaine   Transdermal Q24h     multivitamin w/minerals  1 tablet Oral Daily     PARoxetine  40 mg Oral At Bedtime     propranolol  10 mg Oral TID     rivaroxaban ANTICOAGULANT  20 mg Oral Daily with supper     sodium chloride (PF)  3 mL Intracatheter Q8H     traZODone  75 mg Oral At Bedtime     vitamin B1  100 mg Oral Daily       Data   Recent Labs   Lab 09/28/19  0917 09/27/19  0813 09/26/19  1449   WBC  --  4.7 5.3   HGB  --  10.5* 11.5*   MCV  --  84 84   PLT  --  237 289   NA  --  140 143   POTASSIUM  --  4.1 3.9   CHLORIDE  --  108 113*   CO2  --  28 28   BUN  --  12 14   CR 0.78 0.81 0.78   ANIONGAP  --  4 2*   MART  --  8.3* 8.5   GLC  --  112* 108*   ALBUMIN  --  2.9* 3.6   PROTTOTAL  --  6.3* 7.2   BILITOTAL  --  0.2 0.1*   ALKPHOS  --  130 153*   ALT  --  21 24   AST  --  19 27       Imaging:   No results found for this or any previous visit (from the past 24 hour(s)).

## 2019-09-29 NOTE — PLAN OF CARE
DATE & TIME: 9/29/19 4780-9882             Cognitive Concerns/ Orientation : A&Ox4   BEHAVIOR & AGGRESSION TOOL COLOR: Green  CIWA SCORE: 1 - now DC'd  ABNL VS/O2: VSS on RA  MOBILITY: Independent  PAIN MANAGMENT:  Tylenol and warm pack given for right mid back pain with relief.  DIET: Regular   BOWEL/BLADDER: Continent  ABNL LAB/BG: N/A  DRAIN/DEVICES: PIV saline locked  TELEMETRY RHYTHM: N/A  SKIN: Intact, dusky  TESTS/PROCEDURES: N/A  D/C DAY/GOALS/PLACE: To inpatient rehab when available. Daughter in [place]. Pt not comfortable going back home to . SW on case.     OTHER IMPORTANT INFO: Baseline numbness/tingling in the hands.  Reported approximately 5 hard stools after drinking part of her magnesium citrate yesterday - senna and miralax given with no results thus far - abdomen soft; BS/flatus (+); no N/V.

## 2019-09-29 NOTE — PROGRESS NOTES
SW:  D: JALEN received vm from Minh at [facility] Intake - patient signed an agreement for [facility] last year. In order to go to [facility] again, patient needs to sign a new agreement. JALEN followed up with Minh - Catie in intake was working with patient and [facility]. [facility] is aware of patient's desire to go back to [facility]. Patient was in contact with Isela at [facility] and needs to call Isela to confirm she wants to go to [facility]. Isela will then have to contact Catie at [facility] for the confirmation, patient will then be contacted to complete a phone screening.   JALEN met with patient and provided update. Patient acknowledged and expressed she understood what she needed to do and who to contact. SW provided patient with Catie's contact information. Patient expressed she's concerned about the lack of support from her  because she relapsed. She's also concerned about payment for treatment which she hopes [facility] is willing to have her on a payment plan. Patient requested for [facility] to meet with her. RN updated.   P: JALEN continue to follow to assist as needed.       Karri Muse, NORTH

## 2019-09-29 NOTE — PLAN OF CARE
DATE & TIME: 9/28/19 7491-0972                Cognitive Concerns/ Orientation : A&Ox4   BEHAVIOR & AGGRESSION TOOL COLOR: Green.  Anxious, restless.  CIWA SCORE: 3, 2  ABNL VS/O2: VSS on RA  MOBILITY: Independent  PAIN MANAGMENT: PRN Tylenol end of shift for left knee pain  DIET: Tolerates regular diet  BOWEL/BLADDER: Continent  ABNL LAB/BG: N/A  DRAIN/DEVICES: PIV saline locked  TELEMETRY RHYTHM: N/A  SKIN: Intact, dusky  TESTS/PROCEDURES: N/A  D/C DAY/GOALS/PLACE: To inpatient treatment, pending.  OTHER IMPORTANT INFO: Baseline numbness/tingling in the hands.  Reported approximately 5 hard stools after drinking part of her magnesium citrate, unable to finish the remainder due to abdominal discomfort associated with drinking it.

## 2019-09-30 PROCEDURE — 90682 RIV4 VACC RECOMBINANT DNA IM: CPT | Performed by: HOSPITALIST

## 2019-09-30 PROCEDURE — 25000132 ZZH RX MED GY IP 250 OP 250 PS 637: Performed by: PSYCHIATRY & NEUROLOGY

## 2019-09-30 PROCEDURE — 25000132 ZZH RX MED GY IP 250 OP 250 PS 637: Performed by: INTERNAL MEDICINE

## 2019-09-30 PROCEDURE — 25000128 H RX IP 250 OP 636: Performed by: HOSPITALIST

## 2019-09-30 PROCEDURE — 12000000 ZZH R&B MED SURG/OB

## 2019-09-30 PROCEDURE — 25000132 ZZH RX MED GY IP 250 OP 250 PS 637: Performed by: HOSPITALIST

## 2019-09-30 PROCEDURE — 99231 SBSQ HOSP IP/OBS SF/LOW 25: CPT | Performed by: INTERNAL MEDICINE

## 2019-09-30 RX ADMIN — LIDOCAINE 1 PATCH: 560 PATCH PERCUTANEOUS; TOPICAL; TRANSDERMAL at 08:09

## 2019-09-30 RX ADMIN — ACETAMINOPHEN 650 MG: 325 TABLET ORAL at 04:28

## 2019-09-30 RX ADMIN — PROPRANOLOL HYDROCHLORIDE 10 MG: 10 TABLET ORAL at 20:07

## 2019-09-30 RX ADMIN — ACETAMINOPHEN 650 MG: 325 TABLET ORAL at 11:22

## 2019-09-30 RX ADMIN — GABAPENTIN 900 MG: 300 CAPSULE ORAL at 20:10

## 2019-09-30 RX ADMIN — TRAZODONE HYDROCHLORIDE 75 MG: 150 TABLET ORAL at 20:10

## 2019-09-30 RX ADMIN — MULTIPLE VITAMINS W/ MINERALS TAB 1 TABLET: TAB at 08:11

## 2019-09-30 RX ADMIN — RIVAROXABAN 20 MG: 20 TABLET, FILM COATED ORAL at 17:09

## 2019-09-30 RX ADMIN — PAROXETINE 40 MG: 40 TABLET, FILM COATED ORAL at 20:10

## 2019-09-30 RX ADMIN — FOLIC ACID 1 MG: 1 TABLET ORAL at 08:11

## 2019-09-30 RX ADMIN — GABAPENTIN 900 MG: 300 CAPSULE ORAL at 14:56

## 2019-09-30 RX ADMIN — ACETAMINOPHEN 650 MG: 325 TABLET ORAL at 20:10

## 2019-09-30 RX ADMIN — PROPRANOLOL HYDROCHLORIDE 10 MG: 10 TABLET ORAL at 08:11

## 2019-09-30 RX ADMIN — LIDOCAINE 1 PATCH: 560 PATCH PERCUTANEOUS; TOPICAL; TRANSDERMAL at 14:53

## 2019-09-30 RX ADMIN — INFLUENZA A VIRUS A/BRISBANE/02/2018 (H1N1) RECOMBINANT HEMAGGLUTININ ANTIGEN, INFLUENZA A VIRUS A/KANSAS/14/2017 (H3N2) RECOMBINANT HEMAGGLUTININ ANTIGEN, INFLUENZA B VIRUS B/PHUKET/3073/2013 RECOMBINANT HEMAGGLUTININ ANTIGEN, AND INFLUENZA B VIRUS B/MARYLAND/15/2016 RECOMBINANT HEMAGGLUTININ ANTIGEN 0.5 ML: 45; 45; 45; 45 INJECTION INTRAMUSCULAR at 10:04

## 2019-09-30 RX ADMIN — PROPRANOLOL HYDROCHLORIDE 10 MG: 10 TABLET ORAL at 14:54

## 2019-09-30 RX ADMIN — SENNOSIDES AND DOCUSATE SODIUM 2 TABLET: 8.6; 5 TABLET ORAL at 10:09

## 2019-09-30 RX ADMIN — GABAPENTIN 900 MG: 300 CAPSULE ORAL at 08:11

## 2019-09-30 RX ADMIN — Medication 100 MG: at 08:11

## 2019-09-30 RX ADMIN — LEVOTHYROXINE SODIUM 75 MCG: 75 TABLET ORAL at 06:53

## 2019-09-30 RX ADMIN — ACETAMINOPHEN 650 MG: 325 TABLET ORAL at 15:23

## 2019-09-30 ASSESSMENT — ACTIVITIES OF DAILY LIVING (ADL)
ADLS_ACUITY_SCORE: 11

## 2019-09-30 NOTE — PROGRESS NOTES
Hospitalist Progress Note  9/30/2019    Patient seen and examined. No new issues. Medically stable for discharge to rehab facility when bed available.    Full note to follow.    Neda Lau MD

## 2019-09-30 NOTE — PROGRESS NOTES
[facility] has clinically accepted patient and has submitted a pre-authorization to [employer].  They hope to be able to admit patient on Tuesday and they have volunteered to provide transportation for patient.  Patient updated.  She has notified her daughter of her plan but hasn't spoken with her  yet.  Dr Lau updated.  PLAN:  Discharge to [facility]  program once insurance gives auhtorization

## 2019-09-30 NOTE — PLAN OF CARE
DATE & TIME: 9/30/19  [id number]    Cognitive Concerns/ Orientation : A&Ox4   BEHAVIOR & AGGRESSION TOOL COLOR: green  CIWA SCORE: d/c'd   ABNL VS/O2: VSS  MOBILITY: up ad pamela independently in room; ambulated in ma  PAIN MANAGMENT: tylenol and heat application for back pain; lidoderm patch on base of neck.  DIET: regular; good intake  BOWEL/BLADDER: continent; constipation is relieved  ABNL LAB/BG: no labs today  DRAIN/DEVICES: PIV R hand,   TELEMETRY RHYTHM: n/a  SKIN: intact  TESTS/PROCEDURES:   D/C DAY/GOALS/PLACE: pending placement to treatment; she is speaking with [facility] and was interviewed.  OTHER IMPORTANT INFO: agreeable and looking forward to alcohol treatment

## 2019-09-30 NOTE — PROGRESS NOTES
"SW:  D:  Patient is ready for discharge today per care coordinator.      I:  Met with patient to finalize her plan.  We spent time processing her feelings and beliefs regarding what she feels she needs to resume sobriety in the community.  She processed her \"feeling of guilt\" and lack of support from her family to re enter a residential program.  Patient reports her family doesn't support entering treatment, because she has been in treatment before and did not maintain her sobriety.   Patient reports she was sober 5 months and believes she relapsed because she did not maintain structured support such as out patient or AA groups   Patient expressed to writer that she needs to enter [facility] and she practiced the conversation she will have with her spouse.    Writer spoke with Karine at [facility] [phone].  She explains patient needs to call [employer] to open a case and then [facility] counselor will call her in patient's room to complete the phone screening.  Patient knows what her out of pocket expense will be under her [employer] plan.      A:  Patient appears to have insight and able to verbalize her needs.    PLAN:  Awaiting the [facility] screening to know if patient will be accepted.   "

## 2019-09-30 NOTE — PLAN OF CARE
DATE & TIME: 9/29/19 [id number]                Cognitive Concerns/ Orientation : A&Ox4   BEHAVIOR & AGGRESSION TOOL COLOR: Green.  Anxious, restless.  CIWA SCORE: N/A  ABNL VS/O2: VSS on RA  MOBILITY: Independent  PAIN MANAGMENT: PRN Tylenol end of shift for left shoulder pain  DIET: Tolerates regular diet  BOWEL/BLADDER: Continent  ABNL LAB/BG: N/A  DRAIN/DEVICES: PIV saline locked  TELEMETRY RHYTHM: N/A  SKIN: Intact, dusky  TESTS/PROCEDURES: N/A  D/C DAY/GOALS/PLACE: To inpatient treatment, pending.  OTHER IMPORTANT INFO: Baseline numbness/tingling in the hands.  PRN Senna x1 for ongoing constipation.  Would like a suppository this morning.

## 2019-10-01 VITALS
SYSTOLIC BLOOD PRESSURE: 113 MMHG | TEMPERATURE: 98.2 F | WEIGHT: 191 LBS | OXYGEN SATURATION: 95 % | HEART RATE: 71 BPM | RESPIRATION RATE: 18 BRPM | HEIGHT: 67 IN | DIASTOLIC BLOOD PRESSURE: 66 MMHG | BODY MASS INDEX: 29.98 KG/M2

## 2019-10-01 LAB
CREAT SERPL-MCNC: 0.8 MG/DL (ref 0.52–1.04)
GFR SERPL CREATININE-BSD FRML MDRD: 78 ML/MIN/{1.73_M2}

## 2019-10-01 PROCEDURE — 25000132 ZZH RX MED GY IP 250 OP 250 PS 637: Performed by: INTERNAL MEDICINE

## 2019-10-01 PROCEDURE — 82565 ASSAY OF CREATININE: CPT | Performed by: HOSPITALIST

## 2019-10-01 PROCEDURE — 25000132 ZZH RX MED GY IP 250 OP 250 PS 637: Performed by: HOSPITALIST

## 2019-10-01 PROCEDURE — 25000131 ZZH RX MED GY IP 250 OP 636 PS 637: Performed by: HOSPITALIST

## 2019-10-01 PROCEDURE — 36415 COLL VENOUS BLD VENIPUNCTURE: CPT | Performed by: HOSPITALIST

## 2019-10-01 PROCEDURE — 99239 HOSP IP/OBS DSCHRG MGMT >30: CPT | Performed by: INTERNAL MEDICINE

## 2019-10-01 RX ORDER — TRAZODONE HYDROCHLORIDE 50 MG/1
25 TABLET, FILM COATED ORAL 2 TIMES DAILY PRN
Qty: 60 TABLET | Refills: 0 | Status: SHIPPED | OUTPATIENT
Start: 2019-10-01

## 2019-10-01 RX ORDER — METHYLPHENIDATE HYDROCHLORIDE 54 MG/1
54 TABLET ORAL EVERY MORNING
Refills: 0 | COMMUNITY
Start: 2019-10-01

## 2019-10-01 RX ORDER — LEVOTHYROXINE SODIUM 75 UG/1
75 TABLET ORAL DAILY
Qty: 30 TABLET | Refills: 0 | Status: SHIPPED | OUTPATIENT
Start: 2019-10-01

## 2019-10-01 RX ORDER — FOLIC ACID 1 MG/1
1 TABLET ORAL DAILY
Qty: 30 TABLET | Refills: 0 | Status: SHIPPED | OUTPATIENT
Start: 2019-10-02

## 2019-10-01 RX ORDER — EPINEPHRINE 0.3 MG/.3ML
0.3 INJECTION SUBCUTANEOUS PRN
Qty: 1 EACH | Refills: 0 | Status: SHIPPED | OUTPATIENT
Start: 2019-10-01

## 2019-10-01 RX ORDER — LIDOCAINE 4 G/G
1 PATCH TOPICAL DAILY PRN
Qty: 30 PATCH | Refills: 0 | Status: SHIPPED | OUTPATIENT
Start: 2019-10-01

## 2019-10-01 RX ORDER — HYDROXYZINE HYDROCHLORIDE 25 MG/1
25-50 TABLET, FILM COATED ORAL 3 TIMES DAILY PRN
Qty: 45 TABLET | Refills: 0 | Status: SHIPPED | OUTPATIENT
Start: 2019-10-01

## 2019-10-01 RX ORDER — FERROUS SULFATE 325(65) MG
325 TABLET ORAL EVERY OTHER DAY
Qty: 15 TABLET | Refills: 0 | Status: SHIPPED | OUTPATIENT
Start: 2019-10-01

## 2019-10-01 RX ORDER — PROPRANOLOL HYDROCHLORIDE 10 MG/1
10 TABLET ORAL 3 TIMES DAILY
Qty: 90 TABLET | Refills: 0 | Status: SHIPPED | OUTPATIENT
Start: 2019-10-01

## 2019-10-01 RX ORDER — MULTIPLE VITAMINS W/ MINERALS TAB 9MG-400MCG
1 TAB ORAL DAILY
Qty: 30 TABLET | Refills: 0 | Status: SHIPPED | OUTPATIENT
Start: 2019-10-02

## 2019-10-01 RX ORDER — PAROXETINE 40 MG/1
40 TABLET, FILM COATED ORAL EVERY MORNING
Qty: 30 TABLET | Refills: 0 | Status: SHIPPED | OUTPATIENT
Start: 2019-10-01

## 2019-10-01 RX ORDER — TRAZODONE HYDROCHLORIDE 150 MG/1
75 TABLET ORAL AT BEDTIME
Qty: 16 TABLET | Refills: 0 | Status: SHIPPED | OUTPATIENT
Start: 2019-10-01

## 2019-10-01 RX ORDER — GABAPENTIN 300 MG/1
900 CAPSULE ORAL 3 TIMES DAILY
Qty: 270 CAPSULE | Refills: 0 | Status: SHIPPED | OUTPATIENT
Start: 2019-10-01

## 2019-10-01 RX ORDER — ZOLMITRIPTAN 5 MG/1
5 TABLET, ORALLY DISINTEGRATING ORAL DAILY PRN
Qty: 3 TABLET | Refills: 0 | Status: SHIPPED | OUTPATIENT
Start: 2019-10-01

## 2019-10-01 RX ADMIN — MULTIPLE VITAMINS W/ MINERALS TAB 1 TABLET: TAB at 09:57

## 2019-10-01 RX ADMIN — LIDOCAINE 1 PATCH: 560 PATCH PERCUTANEOUS; TOPICAL; TRANSDERMAL at 08:47

## 2019-10-01 RX ADMIN — FOLIC ACID 1 MG: 1 TABLET ORAL at 09:57

## 2019-10-01 RX ADMIN — LEVOTHYROXINE SODIUM 75 MCG: 75 TABLET ORAL at 07:00

## 2019-10-01 RX ADMIN — PROPRANOLOL HYDROCHLORIDE 10 MG: 10 TABLET ORAL at 09:57

## 2019-10-01 RX ADMIN — GABAPENTIN 900 MG: 300 CAPSULE ORAL at 09:57

## 2019-10-01 RX ADMIN — ACETAMINOPHEN 650 MG: 325 TABLET ORAL at 03:02

## 2019-10-01 RX ADMIN — PROPRANOLOL HYDROCHLORIDE 10 MG: 10 TABLET ORAL at 14:50

## 2019-10-01 RX ADMIN — ONDANSETRON 4 MG: 4 TABLET, ORALLY DISINTEGRATING ORAL at 08:43

## 2019-10-01 RX ADMIN — GABAPENTIN 900 MG: 300 CAPSULE ORAL at 14:49

## 2019-10-01 ASSESSMENT — ACTIVITIES OF DAILY LIVING (ADL)
ADLS_ACUITY_SCORE: 11

## 2019-10-01 NOTE — PLAN OF CARE
DATE & TIME: 09/30/2019 0257-4606                                   Cognitive Concerns/ Orientation : A&O x3   BEHAVIOR & AGGRESSION TOOL COLOR: Green  CIWA SCORE: NA, Hx: alcohol abuse no longer withdrawing        ABNL VS/O2: VSS on RA  MOBILITY: independent  PAIN MANAGMENT: tylenol given 1x during shift for mid back pain. Lidocaine patch removed. Heat applied to manage symptoms.   DIET: regular  BOWEL/BLADDER: BM today. Continent.  ABNL LAB/BG: NA  DRAIN/DEVICES: PIV R arm, SL   TELEMETRY RHYTHM: NA  SKIN: WDL, intact  TESTS/PROCEDURES: NA             D/C DAY/GOALS/PLACE: hopefully discharge tomorrow. [facility] treatment [facility] has approved pt. Waiting for [employer] acceptance.   OTHER IMPORTANT INFO: [facility] has volunteered to provide transport to facility upon discharge from hospital. Pt has 3 different envelopes w/ security.

## 2019-10-01 NOTE — PROVIDER NOTIFICATION
Text paged Dr. Lau.  Patient upset that Concerta now included in discharge medications.  She states she cannot concentrate on treatment without the med.  Awaiting return call/orders.

## 2019-10-01 NOTE — PLAN OF CARE
DATE & TIME: 10/1/19  9820-7786    Cognitive Concerns/ Orientation : no concerns; A&Ox4  BEHAVIOR & AGGRESSION TOOL COLOR: green  CIWA SCORE: no longer scoring on CIWA  ABNL VS/O2: VSS  MOBILITY: up ad pamela  PAIN MANAGMENT: denies pain today; lidocaine patch on mid back, declined offer of tylenol  DIET: regular  BOWEL/BLADDER: continent  ABNL LAB/BG:   DRAIN/DEVICES: removed  TELEMETRY RHYTHM:   SKIN: scattered bruising  TESTS/PROCEDURES:   D/C DAY/GOALS/PLACE: today at 4 pm to door #2 for [facility] (treatment center) will be picking her up  OTHER IMPORTANT INFO: Nausea this morning; relieved by zofran and aromatherapy.

## 2019-10-01 NOTE — DISCHARGE SUMMARY
[facility]  Discharge Summary  Hospitalist    Date of Admission:  9/26/2019  Date of Discharge:  10/1/2019 to inpatient chemical dependency treatment  Discharging Provider: Neda Lau MD    Discharge Diagnoses   Alcohol abuse with acute alcohol withdrawal  Depression  Generalized Anxiety    History of Present Illness   Kay Dunham is an 64 year old female who presented with acute alcohol intoxication and desire for CD treatment. Please see admission H&P for complete details.     Hospital Course   Kay Dunham was admitted on 9/26/2019.  The following problems were addressed during her hospitalization:  Kay Dunham is a 64 year old female with longstanding alcohol abuse who was admitted on 9/26/2019 with alcohol withdrawal.      Alcohol abuse with acute alcohol withdrawal  Drinking 1.5 pints whiskey daily, last drink morning of 9/26 with BAL of 0.17 at admission.  Presented with mild tachycardia and tremor, reporting symptoms of withdrawal.  Wishes to re-enter treatment program.  - Psych and CD consulted  - [facility] protocol  - oral thiamine, folate, MVI given  - discharge to inpatient CD treatment facility     Possible chorea    - neuro consult -- not thought to be significant    Elevated alkaline phos  Alk phos 153 at admission, bili/AST/ALT wnl.    - repeat normal at 130     Depression  Generalized Anxiety  - Psych consulted   - Paxil at bedtime  - seroquel discontinued Seroquel and start   - Trazodone 75 mg at bedtime and 25 mg prn for anxiety    Reported h/o ADD  -concerta stopped. Can reassess in the outpatient setting.     Neda Lau MD  ~~~~~~~~~~~~~~~~~~~~~~~~~~~~~~~~~~~~~~~~~~~~~~~~~~~~~~~~~~~    Pending Results   These results will be followed up by Hospitalist.  Unresulted Labs Ordered in the Past 30 Days of this Admission     Date and Time Order Name Status Description    9/26/2019 1449 T4 free In process         Code Status   Full Code       Primary Care Physician   Erick  NELI Hunter    Physical Exam   Temp: 98.2  F (36.8  C) Temp src: Oral BP: 113/66 Pulse: 71 Heart Rate: 76 Resp: 18 SpO2: 95 % O2 Device: None (Room air)    Vitals:    09/26/19 1353   Weight: 86.6 kg (191 lb)     Vital Signs with Ranges  Temp:  [97.5  F (36.4  C)-98.2  F (36.8  C)] 98.2  F (36.8  C)  Pulse:  [71] 71  Heart Rate:  [63-76] 76  Resp:  [18] 18  BP: (108-123)/(66-71) 113/66  SpO2:  [94 %-96 %] 95 %  I/O last 3 completed shifts:  In: 240 [P.O.:240]  Out: -     Constitutional: Alert, NAD, pleasant and cooperative    Discharge Disposition   Discharged to inpatient CD treatment  Condition at discharge: Stable    Consultations This Hospital Stay   ADVANCE DIRECTIVE IP CONSULT  NEUROLOGY IP CONSULT  OCCUPATIONAL THERAPY ADULT IP CONSULT  PHYSICAL THERAPY ADULT IP CONSULT  PSYCHIATRY IP CONSULT  CHEMICAL DEPENDENCY IP CONSULT    Time Spent on this Encounter   I, Neda Lau MD, personally saw the patient today and spent 35 minutes discharging this patient.    Discharge Orders      Reason for your hospital stay    Alcohol intoxication, alcohol withdrawal     Follow-up and recommended labs and tests     Follow up with primary care provider, Erick Hunter, or facility physician within 7 days for hospital follow- up and refill medications.  No follow up labs or test are needed.     Activity    Your activity upon discharge: activity as tolerated     Full Code     Diet    Follow this diet upon discharge:       Combination Diet Regular Diet Adult     Discharge Medications   Current Discharge Medication List      START taking these medications    Details   folic acid (FOLVITE) 1 MG tablet Take 1 tablet (1 mg) by mouth daily  Qty: 30 tablet, Refills: 0    Associated Diagnoses: Alcohol dependence with withdrawal with complication (H)      melatonin 1 MG TABS tablet Take 1 tablet (1 mg) by mouth nightly as needed for sleep  Qty: 30 tablet, Refills: 0    Associated Diagnoses: Anxiety 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 traZODone (DESYREL) 150 MG tablet Take 0.5 tablets (75 mg) by mouth At Bedtime  Qty: 16 tablet, Refills: 0    Associated Diagnoses: Anxiety disorder due to known physiological condition      !! traZODone (DESYREL) 50 MG tablet Take 0.5 tablets (25 mg) by mouth 2 times daily as needed for other (anxiety)  Qty: 60 tablet, Refills: 0    Associated Diagnoses: Anxiety disorder due to known physiological condition       !! - Potential duplicate medications found. Please discuss with provider.      CONTINUE these medications which have CHANGED    Details   EPINEPHrine (EPIPEN/ADRENACLICK/OR ANY BX GENERIC EQUIV) 0.3 MG/0.3ML injection 2-pack Inject 0.3 mLs (0.3 mg) into the muscle as needed for anaphylaxis  Qty: 1 each, Refills: 0    Associated Diagnoses: Anaphylaxis, subsequent encounter      ferrous sulfate (FEROSUL) 325 (65 Fe) MG tablet Take 1 tablet (325 mg) by mouth every other day  Qty: 15 tablet, Refills: 0    Associated Diagnoses: Iron deficiency      gabapentin (NEURONTIN) 300 MG capsule Take 3 capsules (900 mg) by mouth 3 times daily  Qty: 270 capsule, Refills: 0    Associated Diagnoses: Neuropathic pain      hydrOXYzine (ATARAX) 25 MG tablet Take 1-2 tablets (25-50 mg) by mouth 3 times daily as needed for anxiety  Qty: 45 tablet, Refills: 0    Associated Diagnoses: Anxiety disorder due to known physiological condition      levothyroxine (SYNTHROID/LEVOTHROID) 75 MCG tablet Take 1 tablet (75 mcg) by mouth daily  Qty: 30 tablet, Refills: 0    Associated Diagnoses: Hypothyroidism, unspecified type       Lidocaine (LIDOCARE) 4 % Patch Place 1 patch onto the skin daily as needed for moderate pain To prevent lidocaine toxicity, patient should be patch free for 12 hrs daily.  Qty: 30 patch, Refills: 0    Associated Diagnoses: Chronic bilateral low back pain, unspecified whether sciatica present      methylphenidate (CONCERTA) 54 MG CR tablet Take 1 tablet (54 mg) by mouth every morning  Refills: 0      PARoxetine (PAXIL) 40 MG tablet Take 1 tablet (40 mg) by mouth every morning  Qty: 30 tablet, Refills: 0    Associated Diagnoses: Uncomplicated alcohol dependence (H)      rivaroxaban ANTICOAGULANT (XARELTO) 20 MG TABS tablet Take 1 tablet (20 mg) by mouth daily (with dinner)  Qty: 30 tablet, Refills: 0    Associated Diagnoses: History of deep venous thrombosis      ZOLMitriptan (ZOMIG-ZMT) 5 MG ODT Take 1 tablet (5 mg) by mouth daily as needed for migraine (at onset of head ache)  Qty: 3 tablet, Refills: 0    Associated Diagnoses: Hx of migraine headaches         STOP taking these medications       albuterol (PROAIR HFA/PROVENTIL HFA/VENTOLIN HFA) 108 (90 Base) MCG/ACT inhaler Comments:   Reason for Stopping:         QUEtiapine (SEROQUEL) 200 MG tablet Comments:   Reason for Stopping:             Allergies   Allergies   Allergen Reactions     Contrast Dye Swelling     Pistachios [Nuts] Swelling     Facial swelling     Data

## 2019-10-01 NOTE — PLAN OF CARE
DATE & TIME: 10/01/19 9128-4541   Cognitive Concerns/ Orientation : A&O x4   BEHAVIOR & AGGRESSION TOOL COLOR: Green  CIWA SCORE: NA   ABNL VS/O2: VSS on room air   MOBILITY: Independent  PAIN MANAGMENT: PRN Tylenol given x1 for back pain  DIET: Regular  BOWEL/BLADDER: Continent;  no bm this shift  ABNL LAB/BG: None  DRAIN/DEVICES: PIV R arm saline locked  TELEMETRY RHYTHM: NA  SKIN: Intact  TESTS/PROCEDURES: None  D/C DAY/GOALS/PLACE: Hopefully discharge today. [facility] treatment [facility] has approved pt. Waiting for [facility] [employer] acceptance.   OTHER IMPORTANT INFO: INFO: [facility] has volunteered to provide transport to facility upon discharge from hospital. Pt has 3 different envelopes w/ security.

## 2019-10-01 NOTE — PROGRESS NOTES
SW:  D:  [facility].  The program will  patient at 1600 to transport to their program.  The arrangements were made with Karine Stroud ([phone] and AVS and discharge summary were faxed to [phone].

## 2019-10-01 NOTE — PROGRESS NOTES
SW:  D:  Received a call from [employer], they have not received authorization yet from [facility].

## 2019-10-01 NOTE — PROGRESS NOTES
Discharge    Patient discharged to [facility]  via provided transportation.  Care plan note VSS on RA.    Listed belongings gathered and returned to patient. Yes  Care Plan and Patient education resolved: Yes  Prescriptions if needed, hard copies sent with patient  Yes  Home and hospital acquired medications returned to patient: Yes  Medication Bin checked and emptied on discharge Yes  Follow up appointment made for patient: Yes